# Patient Record
Sex: FEMALE | Race: BLACK OR AFRICAN AMERICAN | NOT HISPANIC OR LATINO | URBAN - METROPOLITAN AREA
[De-identification: names, ages, dates, MRNs, and addresses within clinical notes are randomized per-mention and may not be internally consistent; named-entity substitution may affect disease eponyms.]

---

## 2021-01-24 ENCOUNTER — INPATIENT (INPATIENT)
Facility: HOSPITAL | Age: 81
LOS: 15 days | Discharge: HOME | End: 2021-02-09
Attending: INTERNAL MEDICINE | Admitting: INTERNAL MEDICINE
Payer: MEDICAID

## 2021-01-24 VITALS
HEART RATE: 95 BPM | OXYGEN SATURATION: 90 % | RESPIRATION RATE: 21 BRPM | DIASTOLIC BLOOD PRESSURE: 76 MMHG | TEMPERATURE: 101 F | HEIGHT: 64 IN | WEIGHT: 169.98 LBS | SYSTOLIC BLOOD PRESSURE: 149 MMHG

## 2021-01-24 DIAGNOSIS — U07.1 COVID-19: ICD-10-CM

## 2021-01-24 DIAGNOSIS — Z98.890 OTHER SPECIFIED POSTPROCEDURAL STATES: Chronic | ICD-10-CM

## 2021-01-24 DIAGNOSIS — R09.02 HYPOXEMIA: ICD-10-CM

## 2021-01-24 DIAGNOSIS — R06.03 ACUTE RESPIRATORY DISTRESS: ICD-10-CM

## 2021-01-24 DIAGNOSIS — I10 ESSENTIAL (PRIMARY) HYPERTENSION: ICD-10-CM

## 2021-01-24 LAB
ALBUMIN SERPL ELPH-MCNC: 2.8 G/DL — LOW (ref 3.3–5)
ALP SERPL-CCNC: 77 U/L — SIGNIFICANT CHANGE UP (ref 40–120)
ALT FLD-CCNC: 38 U/L — SIGNIFICANT CHANGE UP (ref 12–78)
ANION GAP SERPL CALC-SCNC: 5 MMOL/L — SIGNIFICANT CHANGE UP (ref 5–17)
APTT BLD: 30 SEC — SIGNIFICANT CHANGE UP (ref 27.5–35.5)
AST SERPL-CCNC: 72 U/L — HIGH (ref 15–37)
BILIRUB SERPL-MCNC: 0.3 MG/DL — SIGNIFICANT CHANGE UP (ref 0.2–1.2)
BUN SERPL-MCNC: 7 MG/DL — SIGNIFICANT CHANGE UP (ref 7–23)
CALCIUM SERPL-MCNC: 7.5 MG/DL — LOW (ref 8.5–10.1)
CHLORIDE SERPL-SCNC: 99 MMOL/L — SIGNIFICANT CHANGE UP (ref 96–108)
CK SERPL-CCNC: 687 U/L — HIGH (ref 26–192)
CO2 SERPL-SCNC: 28 MMOL/L — SIGNIFICANT CHANGE UP (ref 22–31)
CREAT SERPL-MCNC: 1.11 MG/DL — SIGNIFICANT CHANGE UP (ref 0.5–1.3)
D DIMER BLD IA.RAPID-MCNC: 298 NG/ML DDU — HIGH
FIBRINOGEN PPP-MCNC: 637 MG/DL — HIGH (ref 290–520)
FLUAV AG NPH QL: SIGNIFICANT CHANGE UP COUNTS
FLUBV AG NPH QL: SIGNIFICANT CHANGE UP COUNTS
GLUCOSE SERPL-MCNC: 157 MG/DL — HIGH (ref 70–99)
INR BLD: 1.12 RATIO — SIGNIFICANT CHANGE UP (ref 0.88–1.16)
LACTATE SERPL-SCNC: 1.4 MMOL/L — SIGNIFICANT CHANGE UP (ref 0.7–2)
NT-PROBNP SERPL-SCNC: 60 PG/ML — SIGNIFICANT CHANGE UP (ref 0–450)
POTASSIUM SERPL-MCNC: 3.8 MMOL/L — SIGNIFICANT CHANGE UP (ref 3.5–5.3)
POTASSIUM SERPL-SCNC: 3.8 MMOL/L — SIGNIFICANT CHANGE UP (ref 3.5–5.3)
PROT SERPL-MCNC: 7 GM/DL — SIGNIFICANT CHANGE UP (ref 6–8.3)
PROTHROM AB SERPL-ACNC: 12.9 SEC — SIGNIFICANT CHANGE UP (ref 10.6–13.6)
RSV RNA NPH QL NAA+NON-PROBE: SIGNIFICANT CHANGE UP COUNTS
SARS-COV-2 RNA SPEC QL NAA+PROBE: DETECTED COUNTS
SODIUM SERPL-SCNC: 132 MMOL/L — LOW (ref 135–145)
TROPONIN I SERPL-MCNC: <.015 NG/ML — SIGNIFICANT CHANGE UP (ref 0.01–0.04)

## 2021-01-24 PROCEDURE — 99285 EMERGENCY DEPT VISIT HI MDM: CPT

## 2021-01-24 PROCEDURE — 93010 ELECTROCARDIOGRAM REPORT: CPT

## 2021-01-24 PROCEDURE — 71045 X-RAY EXAM CHEST 1 VIEW: CPT | Mod: 26

## 2021-01-24 RX ORDER — SODIUM CHLORIDE 9 MG/ML
1000 INJECTION, SOLUTION INTRAVENOUS
Refills: 0 | Status: DISCONTINUED | OUTPATIENT
Start: 2021-01-24 | End: 2021-01-27

## 2021-01-24 RX ORDER — REMDESIVIR 5 MG/ML
200 INJECTION INTRAVENOUS EVERY 24 HOURS
Refills: 0 | Status: COMPLETED | OUTPATIENT
Start: 2021-01-24 | End: 2021-01-25

## 2021-01-24 RX ORDER — ACETAMINOPHEN 500 MG
650 TABLET ORAL EVERY 4 HOURS
Refills: 0 | Status: DISCONTINUED | OUTPATIENT
Start: 2021-01-24 | End: 2021-02-09

## 2021-01-24 RX ORDER — AZITHROMYCIN 500 MG/1
500 TABLET, FILM COATED ORAL EVERY 24 HOURS
Refills: 0 | Status: COMPLETED | OUTPATIENT
Start: 2021-01-25 | End: 2021-01-25

## 2021-01-24 RX ORDER — AMLODIPINE BESYLATE 2.5 MG/1
5 TABLET ORAL DAILY
Refills: 0 | Status: DISCONTINUED | OUTPATIENT
Start: 2021-01-24 | End: 2021-02-09

## 2021-01-24 RX ORDER — AZITHROMYCIN 500 MG/1
500 TABLET, FILM COATED ORAL ONCE
Refills: 0 | Status: COMPLETED | OUTPATIENT
Start: 2021-01-24 | End: 2021-01-24

## 2021-01-24 RX ORDER — DEXAMETHASONE 0.5 MG/5ML
6 ELIXIR ORAL DAILY
Refills: 0 | Status: DISCONTINUED | OUTPATIENT
Start: 2021-01-24 | End: 2021-01-26

## 2021-01-24 RX ORDER — AZITHROMYCIN 500 MG/1
TABLET, FILM COATED ORAL
Refills: 0 | Status: COMPLETED | OUTPATIENT
Start: 2021-01-24 | End: 2021-01-26

## 2021-01-24 RX ORDER — ENOXAPARIN SODIUM 100 MG/ML
40 INJECTION SUBCUTANEOUS DAILY
Refills: 0 | Status: DISCONTINUED | OUTPATIENT
Start: 2021-01-24 | End: 2021-01-25

## 2021-01-24 RX ORDER — REMDESIVIR 5 MG/ML
INJECTION INTRAVENOUS
Refills: 0 | Status: COMPLETED | OUTPATIENT
Start: 2021-01-24 | End: 2021-01-29

## 2021-01-24 RX ORDER — ALBUTEROL 90 UG/1
2 AEROSOL, METERED ORAL EVERY 4 HOURS
Refills: 0 | Status: DISCONTINUED | OUTPATIENT
Start: 2021-01-24 | End: 2021-02-09

## 2021-01-24 RX ADMIN — AZITHROMYCIN 255 MILLIGRAM(S): 500 TABLET, FILM COATED ORAL at 23:08

## 2021-01-24 RX ADMIN — Medication 6 MILLIGRAM(S): at 22:16

## 2021-01-24 RX ADMIN — SODIUM CHLORIDE 70 MILLILITER(S): 9 INJECTION, SOLUTION INTRAVENOUS at 22:59

## 2021-01-24 NOTE — ED PROVIDER NOTE - CLINICAL SUMMARY MEDICAL DECISION MAKING FREE TEXT BOX
covid-19 hypoxia 80F pmhx of htn, covid-19 (+) x 6 days, presenting with worsening hypoxia (+) 90% on 2LNC, (+) cough, (+) exertional dyspnea over past few days, (+) mild respiratory distress. Placed on NRB and NC, improved to 100%. Pending CTA chest to R/O PE. Admitted to Dr. Farah's service. EKG unremarkable. Do not suspect ACS.

## 2021-01-24 NOTE — H&P ADULT - HISTORY OF PRESENT ILLNESS
80F Akan speaking  PMHX HTN c/o fever for few days and get  tested COVID-19 (+) 6 days ago but the past few days she is having shortness of breath as well  as worsening dyspnea on exertion now presenting with hypoxia 90% on RA. Pt stated she is feeling tired and weak unable to ambulate.

## 2021-01-24 NOTE — H&P ADULT - ASSESSMENT
80F PMHX HTN c/o fever for few days and get  tested COVID-19 (+) 6 days ago but the past few days she is having shortness of breath as well  as worsening dyspnea on exertion now presenting with hypoxia 90% on RA. Pt stated she is feeling tired and weak unable to ambulate.

## 2021-01-24 NOTE — H&P ADULT - REASON FOR ADMISSION
September 22, 2017    Tiffanie Castrejon  [address] [place] [id number]    Dear Tiffanie,  We are happy to inform you that your PAP smear result from 9/7/17 is normal.  We are now able to do a follow up test on PAP smears. The DNA test is for HPV (Human Papilloma Virus). Cervical cancer is closely linked with certain types of HPV. Your result showed no evidence of high risk HPV.  Therefore we recommend you return in 3 years for your next pap smear.  You will still need to return to the clinic every year for an annual exam and other preventive tests.  Please contact the clinic at [phone] with any questions.  Sincerely,    ALTAGRACIA Winslow CNP/rlm  
sob

## 2021-01-24 NOTE — ED PROVIDER NOTE - PHYSICAL EXAMINATION
VITAL SIGNS: I have reviewed nursing notes and confirm.   GEN: Well-developed; well-nourished; in mild acute resp distress. (+) hypoxia to 90% on 2L NC. Speaking few words per sentences.  SKIN: Warm, pink, no rash, no diaphoresis, no cyanosis, well perfused.   HEAD: Normocephalic; atraumatic. No scalp lacerations, no abrasions.  NECK: Supple; non tender.   EYES: Pupils 3mm equal, round, reactive to light and accomodation, conjunctiva and sclera clear. Extra-ocular movements intact bilaterally.  ENT: No nasal discharge; airway clear. Trachea is midline. ORAL: No oropharyngeal exudates or erythema. Normal dentition.  CV: RRR, S1, S2 normal; no murmurs, gallops, or rubs. No lower extremity pitting edema bilaterally. Capillary refill < 2 seconds throughout. Distal pulses intact 2+ throughout.  RESP: (+) tachypneic, CTA bilaterally. (+) LLL rhonchi, no wheezing or rales.  ABD: Normal bowel sounds, soft, non-distended, non-tender, no hepatosplenomegaly. No CVA tenderness bilaterally.  MSK: Normal range of motion and movement of all 4 extremities. No joint or muscular pain throughout. No clubbing.   BACK: No thoracolumbar midline or paravertebral tenderness. No step-offs or obvious deformities.  NEURO: Alert & oriented x 3, Grossly unremarkable. Sensory and motor intact throughout. No focal deficits. Gait: Fluid. Normal speech and coordination.   PSYCH: Cooperative, appropriate.

## 2021-01-24 NOTE — ED ADULT NURSE NOTE - OBJECTIVE STATEMENT
A&Ox4. pt covid +. pt c/o fever/bodyaches/sob/dehydration since last week as per pt's grand-daughter.

## 2021-01-24 NOTE — ED PROVIDER NOTE - NS ED MD DISPO SPECIAL CONSIDERATION1
Fall Precaution/Language Barrier/Close Observation/Remote Telemetry/Continuous Pulse Oximetry/Geriatrics/Frailty/Confirmed COVID-19

## 2021-01-24 NOTE — ED PROVIDER NOTE - OBJECTIVE STATEMENT
80F PMHX HTN presenting with shortness of breath, COVID-19 (+) 6 days ago. Described as worsening dyspnea on exertion now presenting with hypoxia 90% on RA. ROS: Otherwise,  (-) diaphoresis, (+) exertional component, (-) pleuritic component, (-) positional component, (-) abdominal pain, (-) nausea/vomiting, (-) fevers/chills, (-) recent illness, (-) traumatic injury,  (-) shortness of breath, (-) coughing, (-) prior cardiac history, (-) tobacco, (-) IVDU or cocaine use. 80F PMHX HTN presenting with shortness of breath, COVID-19 (+) 6 days ago. Described as worsening dyspnea on exertion, fevers/chills, myalgias now presenting with hypoxia 90% on RA and generalized weakness. In ED, speaking few words per sentence. Denies any abdominal pain, nausea/vomiting,, diarrhea/constipation, dysuria/hematuria, chest pain, syncope, recent travel, recent surgery, immobility, extremity swelling, hormone use, or personal/family history of blood clots.

## 2021-01-25 LAB
ALBUMIN SERPL ELPH-MCNC: 2.7 G/DL — LOW (ref 3.3–5)
ALP SERPL-CCNC: 81 U/L — SIGNIFICANT CHANGE UP (ref 40–120)
ALT FLD-CCNC: 36 U/L — SIGNIFICANT CHANGE UP (ref 12–78)
AST SERPL-CCNC: 70 U/L — HIGH (ref 15–37)
BASE EXCESS BLDV CALC-SCNC: 2.2 MMOL/L — HIGH (ref -2–2)
BASOPHILS # BLD AUTO: 0 K/UL — SIGNIFICANT CHANGE UP (ref 0–0.2)
BASOPHILS NFR BLD AUTO: 0 % — SIGNIFICANT CHANGE UP (ref 0–2)
BILIRUB DIRECT SERPL-MCNC: 0.15 MG/DL — SIGNIFICANT CHANGE UP (ref 0.05–0.2)
BILIRUB INDIRECT FLD-MCNC: 0.2 MG/DL — SIGNIFICANT CHANGE UP (ref 0.2–1)
BILIRUB SERPL-MCNC: 0.3 MG/DL — SIGNIFICANT CHANGE UP (ref 0.2–1.2)
BLOOD GAS COMMENTS, VENOUS: SIGNIFICANT CHANGE UP
CREAT SERPL-MCNC: 0.99 MG/DL — SIGNIFICANT CHANGE UP (ref 0.5–1.3)
CRP SERPL-MCNC: 6.34 MG/DL — HIGH (ref 0–0.4)
EOSINOPHIL # BLD AUTO: 0 K/UL — SIGNIFICANT CHANGE UP (ref 0–0.5)
EOSINOPHIL NFR BLD AUTO: 0 % — SIGNIFICANT CHANGE UP (ref 0–6)
FERRITIN SERPL-MCNC: 469 NG/ML — HIGH (ref 15–150)
GAS PNL BLDV: SIGNIFICANT CHANGE UP
HCO3 BLDV-SCNC: 25 MMOL/L — SIGNIFICANT CHANGE UP (ref 21–29)
HCT VFR BLD CALC: 38.7 % — SIGNIFICANT CHANGE UP (ref 34.5–45)
HGB BLD-MCNC: 13.3 G/DL — SIGNIFICANT CHANGE UP (ref 11.5–15.5)
HOROWITZ INDEX BLDV+IHG-RTO: 0.4 — SIGNIFICANT CHANGE UP
LDH SERPL L TO P-CCNC: 553 U/L — HIGH (ref 50–242)
LYMPHOCYTES # BLD AUTO: 1.59 K/UL — SIGNIFICANT CHANGE UP (ref 1–3.3)
LYMPHOCYTES # BLD AUTO: 38 % — SIGNIFICANT CHANGE UP (ref 13–44)
MANUAL SMEAR VERIFICATION: SIGNIFICANT CHANGE UP
MCHC RBC-ENTMCNC: 30 PG — SIGNIFICANT CHANGE UP (ref 27–34)
MCHC RBC-ENTMCNC: 34.4 GM/DL — SIGNIFICANT CHANGE UP (ref 32–36)
MCV RBC AUTO: 87.4 FL — SIGNIFICANT CHANGE UP (ref 80–100)
MONOCYTES # BLD AUTO: 0.08 K/UL — SIGNIFICANT CHANGE UP (ref 0–0.9)
MONOCYTES NFR BLD AUTO: 2 % — SIGNIFICANT CHANGE UP (ref 2–14)
NEUTROPHILS # BLD AUTO: 2.34 K/UL — SIGNIFICANT CHANGE UP (ref 1.8–7.4)
NEUTROPHILS NFR BLD AUTO: 56 % — SIGNIFICANT CHANGE UP (ref 43–77)
NRBC # BLD: 0 /100 — SIGNIFICANT CHANGE UP (ref 0–0)
NRBC # BLD: SIGNIFICANT CHANGE UP /100 WBCS (ref 0–0)
PCO2 BLDV: 36 MMHG — SIGNIFICANT CHANGE UP (ref 35–50)
PH BLDV: 7.47 — HIGH (ref 7.35–7.45)
PLAT MORPH BLD: NORMAL — SIGNIFICANT CHANGE UP
PLATELET # BLD AUTO: 128 K/UL — LOW (ref 150–400)
PO2 BLDV: 47 MMHG — HIGH (ref 25–45)
PROCALCITONIN SERPL-MCNC: 0.05 NG/ML — SIGNIFICANT CHANGE UP (ref 0.02–0.1)
PROT SERPL-MCNC: 7 GM/DL — SIGNIFICANT CHANGE UP (ref 6–8.3)
RBC # BLD: 4.43 M/UL — SIGNIFICANT CHANGE UP (ref 3.8–5.2)
RBC # FLD: 13.4 % — SIGNIFICANT CHANGE UP (ref 10.3–14.5)
RBC BLD AUTO: NORMAL — SIGNIFICANT CHANGE UP
SAO2 % BLDV: 82 % — SIGNIFICANT CHANGE UP (ref 67–88)
SARS-COV-2 IGG SERPL QL IA: NEGATIVE — SIGNIFICANT CHANGE UP
SARS-COV-2 IGM SERPL IA-ACNC: <0.2 RATIO — SIGNIFICANT CHANGE UP
VARIANT LYMPHS # BLD: 4 % — SIGNIFICANT CHANGE UP (ref 0–6)
WBC # BLD: 4.18 K/UL — SIGNIFICANT CHANGE UP (ref 3.8–10.5)
WBC # FLD AUTO: 4.18 K/UL — SIGNIFICANT CHANGE UP (ref 3.8–10.5)

## 2021-01-25 RX ORDER — ENOXAPARIN SODIUM 100 MG/ML
40 INJECTION SUBCUTANEOUS DAILY
Refills: 0 | Status: DISCONTINUED | OUTPATIENT
Start: 2021-01-26 | End: 2021-01-26

## 2021-01-25 RX ORDER — INFLUENZA VIRUS VACCINE 15; 15; 15; 15 UG/.5ML; UG/.5ML; UG/.5ML; UG/.5ML
0.5 SUSPENSION INTRAMUSCULAR ONCE
Refills: 0 | Status: DISCONTINUED | OUTPATIENT
Start: 2021-01-25 | End: 2021-02-09

## 2021-01-25 RX ORDER — REMDESIVIR 5 MG/ML
100 INJECTION INTRAVENOUS EVERY 24 HOURS
Refills: 0 | Status: COMPLETED | OUTPATIENT
Start: 2021-01-26 | End: 2021-01-28

## 2021-01-25 RX ADMIN — SODIUM CHLORIDE 70 MILLILITER(S): 9 INJECTION, SOLUTION INTRAVENOUS at 17:18

## 2021-01-25 RX ADMIN — ENOXAPARIN SODIUM 40 MILLIGRAM(S): 100 INJECTION SUBCUTANEOUS at 12:23

## 2021-01-25 RX ADMIN — AZITHROMYCIN 255 MILLIGRAM(S): 500 TABLET, FILM COATED ORAL at 22:20

## 2021-01-25 RX ADMIN — Medication 6 MILLIGRAM(S): at 05:51

## 2021-01-25 RX ADMIN — REMDESIVIR 500 MILLIGRAM(S): 5 INJECTION INTRAVENOUS at 00:41

## 2021-01-25 NOTE — PROGRESS NOTE ADULT - SUBJECTIVE AND OBJECTIVE BOX
Statement Selected Patient is a 80y old  Female who presents with a chief complaint of sob (24 Jan 2021 22:09)      INTERVAL HPI/OVERNIGHT EVENTS:  pt is feeling better on NC   MEDICATIONS  (STANDING):  amLODIPine   Tablet 5 milliGRAM(s) Oral daily  azithromycin  IVPB      azithromycin  IVPB 500 milliGRAM(s) IV Intermittent every 24 hours  dexAMETHasone  Injectable 6 milliGRAM(s) IV Push daily  enoxaparin Injectable 40 milliGRAM(s) SubCutaneous daily  influenza   Vaccine 0.5 milliLiter(s) IntraMuscular once  remdesivir  IVPB   IV Intermittent   sodium chloride 0.45%. 1000 milliLiter(s) (70 mL/Hr) IV Continuous <Continuous>    MEDICATIONS  (PRN):  acetaminophen   Tablet .. 650 milliGRAM(s) Oral every 4 hours PRN Temp greater or equal to 38.5C (101.3F)  ALBUTerol    90 MICROgram(s) HFA Inhaler 2 Puff(s) Inhalation every 4 hours PRN Shortness of Breath and/or Wheezing  benzonatate 100 milliGRAM(s) Oral three times a day PRN Cough      Allergies    No Known Allergies    Intolerances        REVIEW OF SYSTEMS:  CONSTITUTIONAL: No fever, weight loss, or fatigue  EYES: No eye pain, visual disturbances, or discharge  ENMT:  No difficulty hearing, tinnitus, vertigo; No sinus or throat pain  NECK: No pain or stiffness  BREASTS: No pain, masses, or nipple discharge  RESPIRATORY: No cough, wheezing, chills or hemoptysis; No shortness of breath  CARDIOVASCULAR: No chest pain, palpitations, dizziness, or leg swelling  GASTROINTESTINAL: No abdominal or epigastric pain. No nausea, vomiting, or hematemesis; No diarrhea or constipation. No melena or hematochezia.  GENITOURINARY: No dysuria, frequency, hematuria, or incontinence  NEUROLOGICAL: No headaches, memory loss, loss of strength, numbness, or tremors  SKIN: No itching, burning, rashes, or lesions   LYMPH NODES: No enlarged glands  ENDOCRINE: No heat or cold intolerance; No hair loss  MUSCULOSKELETAL: No joint pain or swelling; No muscle, back, or extremity pain  PSYCHIATRIC: No depression, anxiety, mood swings, or difficulty sleeping  HEME/LYMPH: No easy bruising, or bleeding gums  ALLERGY AND IMMUNOLOGIC: No hives or eczema    Vital Signs Last 24 Hrs  T(C): 37.1 (25 Jan 2021 04:02), Max: 38.4 (24 Jan 2021 21:24)  T(F): 98.8 (25 Jan 2021 04:02), Max: 101.1 (24 Jan 2021 21:24)  HR: 87 (25 Jan 2021 04:24) (81 - 95)  BP: 116/71 (25 Jan 2021 04:02) (116/71 - 149/76)  BP(mean): --  RR: 19 (25 Jan 2021 04:02) (19 - 21)  SpO2: 94% (25 Jan 2021 04:02) (90% - 94%)    PHYSICAL EXAM:  GENERAL: NAD, well-groomed, well-developed  HEAD:  Atraumatic, Normocephalic  EYES: EOMI, PERRLA, conjunctiva and sclera clear  ENMT: No tonsillar erythema, exudates, or enlargement; Moist mucous membranes, Good dentition, No lesions  NECK: Supple, No JVD, Normal thyroid  NERVOUS SYSTEM:  Alert & Oriented X3, Good concentration; Motor Strength 5/5 B/L upper and lower extremities; DTRs 2+ intact and symmetric  CHEST/LUNG: Clear to percussion bilaterally; No rales, rhonchi, wheezing, or rubs  HEART: Regular rate and rhythm; No murmurs, rubs, or gallops  ABDOMEN: Soft, Nontender, Nondistended; Bowel sounds present  EXTREMITIES:  2+ Peripheral Pulses, No clubbing, cyanosis, or edema  LYMPH: No lymphadenopathy noted  SKIN: No rashes or lesions    LABS:                        13.3   4.18  )-----------( 128      ( 24 Jan 2021 22:27 )             38.7     01-25    x   |  x   |  x   ----------------------------<  x   x    |  x   |  0.99    Ca    7.5<L>      24 Jan 2021 22:27    TPro  7.0  /  Alb  2.7<L>  /  TBili  0.3  /  DBili  .15  /  AST  70<H>  /  ALT  36  /  AlkPhos  81  01-25    PT/INR - ( 24 Jan 2021 22:27 )   PT: 12.9 sec;   INR: 1.12 ratio         PTT - ( 24 Jan 2021 22:27 )  PTT:30.0 sec    CAPILLARY BLOOD GLUCOSE        CULTURES:    HEMOGLOBIN A1C:    CHOLESTEROL:        RADIOLOGY & ADDITIONAL TESTS:

## 2021-01-25 NOTE — PHYSICAL THERAPY INITIAL EVALUATION ADULT - DID THE PATIENT HAVE SURGERY?
This is the hx of A.A. a 81 y/o female patient who was admitted to [facility] [place] due to complications of Hypoxia, Covid 19, respiratory distress affecting medical condition and with subsequent affection on functional mobility./n/a

## 2021-01-25 NOTE — PHYSICAL THERAPY INITIAL EVALUATION ADULT - GENERAL OBSERVATIONS, REHAB EVAL
Pt encountered on supine, AxOx4,  used Barrett [id number], cooperative, + O2 via NC, + Primafit, + IV intact.

## 2021-01-25 NOTE — CONSULT NOTE ADULT - SUBJECTIVE AND OBJECTIVE BOX
Patient is a 80y old  Female who presents with a chief complaint of sob (25 Jan 2021 11:51)    HPI:  80F Akan speaking  HTN and S/P lumbar Laminectomy,  c/o fever and SOB  for few days .    Tested COVID-19 (+) 6 days ago, started having shortness of breath few days ago with worsening dyspnea on exertion . Also reports increasing weakness.  In ED with temperature of 101, SPO2 90% on room air and CXT with bilateral infiltrates.  Non smoker.    PAST MEDICAL & SURGICAL HISTORY:  Hypertension    S/P lumbar laminectomy    FAMILY HISTORY:  No pertinent family history in first degree relatives    SOCIAL HISTORY: BMI (kg/m2): 29 (01-25 @ 04:02). non smoker    Allergies  No Known Allergies    MEDICATIONS  (STANDING):  amLODIPine   Tablet 5 milliGRAM(s) Oral daily  azithromycin  IVPB      azithromycin  IVPB 500 milliGRAM(s) IV Intermittent every 24 hours  dexAMETHasone  Injectable 6 milliGRAM(s) IV Push daily  enoxaparin Injectable 40 milliGRAM(s) SubCutaneous daily  influenza   Vaccine 0.5 milliLiter(s) IntraMuscular once  remdesivir  IVPB   IV Intermittent   sodium chloride 0.45%. 1000 milliLiter(s) (70 mL/Hr) IV Continuous <Continuous>    MEDICATIONS  (PRN):  acetaminophen   Tablet .. 650 milliGRAM(s) Oral every 4 hours PRN Temp greater or equal to 38.5C (101.3F)  ALBUTerol    90 MICROgram(s) HFA Inhaler 2 Puff(s) Inhalation every 4 hours PRN Shortness of Breath and/or Wheezing  benzonatate 100 milliGRAM(s) Oral three times a day PRN Cough    Vital Signs Last 24 Hrs  T(C): 36.7 (25 Jan 2021 16:12), Max: 38.4 (24 Jan 2021 21:24)  T(F): 98.1 (25 Jan 2021 16:12), Max: 101.1 (24 Jan 2021 21:24)  HR: 84 (25 Jan 2021 16:12) (80 - 95)  BP: 113/66 (25 Jan 2021 16:12) (113/66 - 149/76)  BP(mean): --  RR: 17 (25 Jan 2021 16:12) (16 - 21)  SpO2: 94% (25 Jan 2021 16:12) (90% - 96%)    PHYSICAL EXAM:  GEN:         Awake, responsive and comfortable.  HEENT:    Normal.    RESP:        no distress  CVS:          Regular rate and rhythm.   ABD:         Soft, non-tender, non-distended;   SKIN:           Warm and dry.  EXTR:            No clubbing, cyanosis or edema  CNS:              Intact sensory and motor function.  PSYCH:        cooperative, no anxiety or depression    LABS:                        13.3   4.18  )-----------( 128      ( 24 Jan 2021 22:27 )             38.7     01-25    x   |  x   |  x   ----------------------------<  x   x    |  x   |  0.99    Ca    7.5<L>      24 Jan 2021 22:27    TPro  7.0  /  Alb  2.7<L>  /  TBili  0.3  /  DBili  .15  /  AST  70<H>  /  ALT  36  /  AlkPhos  81  01-25    PT/INR - ( 24 Jan 2021 22:27 )   PT: 12.9 sec;   INR: 1.12 ratio      PTT - ( 24 Jan 2021 22:27 )  PTT:30.0 sec    EKG: sinus    RADIOLOGY & ADDITIONAL STUDIES:  < from: Xray Chest 1 View- PORTABLE-Urgent (01.24.21 @ 22:12) >  EXAM:  XR CHEST PORTABLE URGENT 1V                          PROCEDURE DATE:  01/24/2021      INTERPRETATION:  AP chest on January 24, 2021 at 9:57 PM. Patient is short of breath with cough and fever.    COMPARISON: None available.    Heart isenlarged.    Mid lateral lung field infiltrates are consistent with Covid pneumonia.    IMPRESSION: Heart enlargement and Covid pneumonia.    ADONAY MASON MD; Attending Radiologist  This document has been electronically signed. Jan 25 2021  5:25PM    ASSESSMENT AND PLAN:  ·	SOB.  ·	Hypoxia.  ·	COVID pneumonia.  ·	HTN    SPO2 from high 80s to low 90s on nasal O2.  Will try 50% Venti mask.  Continue Remdesivir and Dexamethasone.  On empiric antibiotics.  Will increase prophylactic Lovenox to BID.  Follow D Dimer.

## 2021-01-25 NOTE — PHYSICAL THERAPY INITIAL EVALUATION ADULT - STRENGTHENING, PT EVAL
Improve strength in B UE/LE 4+/5 and be able to perform functional tasks-bed mobility, sitting, standing, transfers and ambulate in a safe manner with or without  assistive device and prevent falls.

## 2021-01-25 NOTE — PHYSICAL THERAPY INITIAL EVALUATION ADULT - ADDITIONAL COMMENTS
As per granddaughter, she lives in a  with 8 stairs with R HR inside the house has 13 stairs with R HR. she used to have an aide but lately she has been independent with rollator/cane. she has a tub shower with shower chair. DME: pt has cane, rollator, shower chair, grab bars.

## 2021-01-25 NOTE — PHYSICAL THERAPY INITIAL EVALUATION ADULT - PERTINENT HX OF CURRENT PROBLEM, REHAB EVAL
This is the hx of A.A. a 79 y/o female patient who was admitted to [facility] due to complications of Hypoxia, Covid 19, respiratory distress affecting medical condition and with subsequent affection on functional mobility.

## 2021-01-25 NOTE — PHYSICAL THERAPY INITIAL EVALUATION ADULT - CRITERIA FOR SKILLED THERAPEUTIC INTERVENTIONS
[facility]/impairments found/functional limitations in following categories/risk reduction/prevention/rehab potential/therapy frequency/predicted duration of therapy intervention/anticipated discharge recommendation

## 2021-01-25 NOTE — PHYSICAL THERAPY INITIAL EVALUATION ADULT - IMPAIRMENTS FOUND, PT EVAL
aerobic capacity/endurance/gait, locomotion, and balance/muscle strength/posture/ventilation and respiration/gas exchange

## 2021-01-25 NOTE — CONSULT NOTE ADULT - ASSESSMENT
covid pneumonia   serologies are low  procal is low   remdesevir dexa   oxygen   prophyllactic heparin   will follow with you thanks

## 2021-01-25 NOTE — CONSULT NOTE ADULT - SUBJECTIVE AND OBJECTIVE BOX
HPI:  80F Akan speaking  PMHX HTN c/o fever for few days and get  tested COVID-19 (+) 6 days ago but the past few days she is having shortness of breath as well  as worsening dyspnea on exertion now presenting with hypoxia 90% on RA. Pt stated she is feeling tired and weak unable to ambulate. (24 Jan 2021 22:09)      PAST MEDICAL & SURGICAL HISTORY:  Hypertension    S/P lumbar laminectomy        SOCHX:   tobacco,  -  alcohol    FMHX: FA/MO  - contributory       Recent Travel:    Immunizations:    Allergies    No Known Allergies    Intolerances        MEDICATIONS  (STANDING):  amLODIPine   Tablet 5 milliGRAM(s) Oral daily  azithromycin  IVPB      azithromycin  IVPB 500 milliGRAM(s) IV Intermittent every 24 hours  dexAMETHasone  Injectable 6 milliGRAM(s) IV Push daily  enoxaparin Injectable 40 milliGRAM(s) SubCutaneous daily  influenza   Vaccine 0.5 milliLiter(s) IntraMuscular once  remdesivir  IVPB   IV Intermittent   sodium chloride 0.45%. 1000 milliLiter(s) (70 mL/Hr) IV Continuous <Continuous>    MEDICATIONS  (PRN):  acetaminophen   Tablet .. 650 milliGRAM(s) Oral every 4 hours PRN Temp greater or equal to 38.5C (101.3F)  ALBUTerol    90 MICROgram(s) HFA Inhaler 2 Puff(s) Inhalation every 4 hours PRN Shortness of Breath and/or Wheezing  benzonatate 100 milliGRAM(s) Oral three times a day PRN Cough      REVIEW OF SYSTEMS:  CONSTITUTIONAL: No fever, weight loss, or fatigue  EYES: No eye pain, visual disturbances, or discharge  ENMT:  No difficulty hearing, tinnitus, vertigo; No sinus or throat pain  NECK: No pain or stiffness  BREASTS: No pain, masses, or nipple discharge  RESPIRATORY: No cough, wheezing, chills or hemoptysis; No shortness of breath  CARDIOVASCULAR: No chest pain, palpitations, dizziness, or leg swelling  GASTROINTESTINAL: No abdominal or epigastric pain. No nausea, vomiting, or hematemesis; No diarrhea or constipation. No melena or hematochezia.  GENITOURINARY: No dysuria, frequency, hematuria, or incontinence  NEUROLOGICAL: No headaches, memory loss, loss of strength, numbness, or tremors  SKIN: No itching, burning, rashes, or lesions   LYMPH NODES: No enlarged glands  ENDOCRINE: No heat or cold intolerance; No hair loss  MUSCULOSKELETAL: No joint pain or swelling; No muscle, back, or extremity pain  PSYCHIATRIC: No depression, anxiety, mood swings, or difficulty sleeping  HEME/LYMPH: No easy bruising, or bleeding gums  ALLERGY AND IMMUNOLOGIC: No hives or eczema    VITAL SIGNS:    T(C): 36.7 (01-25-21 @ 16:12), Max: 38.4 (01-24-21 @ 21:24)  T(F): 98.1 (01-25-21 @ 16:12), Max: 101.1 (01-24-21 @ 21:24)  HR: 92 (01-25-21 @ 18:38) (80 - 95)  BP: 117/75 (01-25-21 @ 18:38) (113/66 - 149/76)  RR: 18 (01-25-21 @ 18:38) (16 - 21)  SpO2: 92% (01-25-21 @ 18:38) (90% - 96%)    PHYSICAL EXAM:    GENERAL: NAD, well-groomed, well-developed  HEAD:  Atraumatic, Normocephalic  EYES: EOMI, PERRLA, conjunctiva and sclera clear  ENMT: No tonsillar erythema, exudates, or enlargement; Moist mucous membranes, Good dentition, No lesions  NECK: Supple, No JVD, Normal thyroid  NERVOUS SYSTEM:  Alert & Oriented X3, Good concentration; Motor Strength 5/5 B/L upper and lower extremities; DTRs 2+ intact and symmetric  CHEST/LUNG: Clear to auscultation bilaterally; No rales, rhonchi, wheezing bilaterally  HEART: Regular rate and rhythm; No murmurs, rubs, or gallops  ABDOMEN: Soft, Nontender, Nondistended; Bowel sounds present  EXTREMITIES:  2+ Peripheral Pulses, No clubbing, cyanosis, or edema  LYMPH: No lymphadenopathy noted  SKIN: No rashes or lesions  BACK: no pressor sore     LABS:                         13.3   4.18  )-----------( 128      ( 24 Jan 2021 22:27 )             38.7     01-25    x   |  x   |  x   ----------------------------<  x   x    |  x   |  0.99    Ca    7.5<L>      24 Jan 2021 22:27    TPro  7.0  /  Alb  2.7<L>  /  TBili  0.3  /  DBili  .15  /  AST  70<H>  /  ALT  36  /  AlkPhos  81  01-25    LIVER FUNCTIONS - ( 25 Jan 2021 05:43 )  Alb: 2.7 g/dL / Pro: 7.0 gm/dL / ALK PHOS: 81 U/L / ALT: 36 U/L / AST: 70 U/L / GGT: x           PT/INR - ( 24 Jan 2021 22:27 )   PT: 12.9 sec;   INR: 1.12 ratio         PTT - ( 24 Jan 2021 22:27 )  PTT:30.0 sec      CARDIAC MARKERS ( 24 Jan 2021 22:27 )  <.015 ng/mL / x     / 687 U/L / x     / x                                      Radiology:    t< from: Xray Chest 1 View- PORTABLE-Urgent (01.24.21 @ 22:12) >  IMPRESSION: Heart enlargement and Covid pneumonia.            ADONAY MASON MD; Attending Radiologist  This document has been electronically signed. Jan 25 2021  5:25PM    < end of copied text >     HPI:  80F Akan speaking  PMHX HTN c/o fever for few days and get  tested COVID-19 (+) 6 days ago but the past few days she is having shortness of breath as well  as worsening dyspnea on exertion now presenting with hypoxia 90% on RA. Pt stated she is feeling tired and weak unable to ambulate. (24 Jan 2021 22:09)  limited info from patient  lives in [facility]    PAST MEDICAL & SURGICAL HISTORY:  Hypertension    S/P lumbar laminectomy        SOCHX:  no  tobacco, no  -  alcohol    FMHX: FA/MO  - noncontributory       Recent Travel:    Immunizations:    Allergies    No Known Allergies    Intolerances        MEDICATIONS  (STANDING):  amLODIPine   Tablet 5 milliGRAM(s) Oral daily  azithromycin  IVPB      azithromycin  IVPB 500 milliGRAM(s) IV Intermittent every 24 hours  dexAMETHasone  Injectable 6 milliGRAM(s) IV Push daily  enoxaparin Injectable 40 milliGRAM(s) SubCutaneous daily  influenza   Vaccine 0.5 milliLiter(s) IntraMuscular once  remdesivir  IVPB   IV Intermittent   sodium chloride 0.45%. 1000 milliLiter(s) (70 mL/Hr) IV Continuous <Continuous>    MEDICATIONS  (PRN):  acetaminophen   Tablet .. 650 milliGRAM(s) Oral every 4 hours PRN Temp greater or equal to 38.5C (101.3F)  ALBUTerol    90 MICROgram(s) HFA Inhaler 2 Puff(s) Inhalation every 4 hours PRN Shortness of Breath and/or Wheezing  benzonatate 100 milliGRAM(s) Oral three times a day PRN Cough      REVIEW OF SYSTEMS:  CONSTITUTIONAL:  pos fever, weight loss,  fatigue  EYES: No eye pain, visual disturbances, or discharge  ENMT:  No difficulty hearing, tinnitus, vertigo; No sinus or throat pain  NECK: No pain or stiffness  BREASTS: No pain, masses, or nipple discharge  RESPIRATORY:positive  cough, wheezing,   no chills or hemoptysis;   has  shortness of breath  CARDIOVASCULAR: No chest pain, palpitations, dizziness, or leg swelling  GASTROINTESTINAL: No abdominal or epigastric pain. No nausea, vomiting, or hematemesis; No diarrhea or constipation. No melena or hematochezia.  GENITOURINARY: No dysuria, frequency, hematuria, or incontinence  NEUROLOGICAL: No headaches, memory loss, loss of strength, numbness, or tremors  SKIN: No itching, burning, rashes, or lesions   LYMPH NODES: No enlarged glands  ENDOCRINE: No heat or cold intolerance; No hair loss  MUSCULOSKELETAL: No joint pain or swelling; No muscle, back, or extremity pain  PSYCHIATRIC: No depression, anxiety, mood swings, or difficulty sleeping  HEME/LYMPH: No easy bruising, or bleeding gums  ALLERGY AND IMMUNOLOGIC: No hives or eczema  info from chart  VITAL SIGNS:    T(C): 36.7 (01-25-21 @ 16:12), Max: 38.4 (01-24-21 @ 21:24)  T(F): 98.1 (01-25-21 @ 16:12), Max: 101.1 (01-24-21 @ 21:24)  HR: 92 (01-25-21 @ 18:38) (80 - 95)  BP: 117/75 (01-25-21 @ 18:38) (113/66 - 149/76)  RR: 18 (01-25-21 @ 18:38) (16 - 21)  SpO2: 92% (01-25-21 @ 18:38) (90% - 96%)    PHYSICAL EXAM:    GENERAL: NAD, well-groomed, well-developed  on ventimask  HEAD:  Atraumatic, Normocephalic  EYES: EOMI, PERRLA, conjunctiva and sclera clear  ENMT:  Moist mucous membranes,NECK: Supple, No JVD, Normal thyroid  NERVOUS SYSTEM:  Alert & Oriented X3, Good concentration;   limited exam  CHEST/LUNG: Clear to auscultation bilaterally; No rales, rhonchi, wheezing bilaterally  HEART: Regular rate and rhythm; No murmurs, rubs, or gallops  ABDOMEN: Soft, Nontender, Nondistended; Bowel sounds present  EXTREMITIES:  2+ Peripheral Pulses, No clubbing, cyanosis, or edema  LYMPH: No lymphadenopathy noted  SKIN: No rashes or lesions  BACK: no pressor sore     LABS:                         13.3   4.18  )-----------( 128      ( 24 Jan 2021 22:27 )             38.7     01-25    x   |  x   |  x   ----------------------------<  x   x    |  x   |  0.99    Ca    7.5<L>      24 Jan 2021 22:27    TPro  7.0  /  Alb  2.7<L>  /  TBili  0.3  /  DBili  .15  /  AST  70<H>  /  ALT  36  /  AlkPhos  81  01-25    LIVER FUNCTIONS - ( 25 Jan 2021 05:43 )  Alb: 2.7 g/dL / Pro: 7.0 gm/dL / ALK PHOS: 81 U/L / ALT: 36 U/L / AST: 70 U/L / GGT: x           PT/INR - ( 24 Jan 2021 22:27 )   PT: 12.9 sec;   INR: 1.12 ratio         PTT - ( 24 Jan 2021 22:27 )  PTT:30.0 sec      CARDIAC MARKERS ( 24 Jan 2021 22:27 )  <.015 ng/mL / x     / 687 U/L / x     / x                                      Radiology:    t< from: Xray Chest 1 View- PORTABLE-Urgent (01.24.21 @ 22:12) >  IMPRESSION: Heart enlargement and Covid pneumonia.            ADONAY MASON MD; Attending Radiologist  This document has been electronically signed. Jan 25 2021  5:25PM    < end of copied text >

## 2021-01-26 LAB
ALBUMIN SERPL ELPH-MCNC: 2.7 G/DL — LOW (ref 3.3–5)
ALP SERPL-CCNC: 88 U/L — SIGNIFICANT CHANGE UP (ref 40–120)
ALT FLD-CCNC: 43 U/L — SIGNIFICANT CHANGE UP (ref 12–78)
ANION GAP SERPL CALC-SCNC: 7 MMOL/L — SIGNIFICANT CHANGE UP (ref 5–17)
AST SERPL-CCNC: 74 U/L — HIGH (ref 15–37)
BILIRUB DIRECT SERPL-MCNC: 0.13 MG/DL — SIGNIFICANT CHANGE UP (ref 0.05–0.2)
BILIRUB INDIRECT FLD-MCNC: 0.2 MG/DL — SIGNIFICANT CHANGE UP (ref 0.2–1)
BILIRUB SERPL-MCNC: 0.3 MG/DL — SIGNIFICANT CHANGE UP (ref 0.2–1.2)
BUN SERPL-MCNC: 10 MG/DL — SIGNIFICANT CHANGE UP (ref 7–23)
CALCIUM SERPL-MCNC: 7.7 MG/DL — LOW (ref 8.5–10.1)
CHLORIDE SERPL-SCNC: 103 MMOL/L — SIGNIFICANT CHANGE UP (ref 96–108)
CO2 SERPL-SCNC: 28 MMOL/L — SIGNIFICANT CHANGE UP (ref 22–31)
CREAT SERPL-MCNC: 0.7 MG/DL — SIGNIFICANT CHANGE UP (ref 0.5–1.3)
CREAT SERPL-MCNC: 0.73 MG/DL — SIGNIFICANT CHANGE UP (ref 0.5–1.3)
D DIMER BLD IA.RAPID-MCNC: 406 NG/ML DDU — HIGH
GLUCOSE SERPL-MCNC: 93 MG/DL — SIGNIFICANT CHANGE UP (ref 70–99)
HCT VFR BLD CALC: 43.9 % — SIGNIFICANT CHANGE UP (ref 34.5–45)
HGB BLD-MCNC: 14.2 G/DL — SIGNIFICANT CHANGE UP (ref 11.5–15.5)
MCHC RBC-ENTMCNC: 29.4 PG — SIGNIFICANT CHANGE UP (ref 27–34)
MCHC RBC-ENTMCNC: 32.3 GM/DL — SIGNIFICANT CHANGE UP (ref 32–36)
MCV RBC AUTO: 90.9 FL — SIGNIFICANT CHANGE UP (ref 80–100)
NRBC # BLD: 0 /100 WBCS — SIGNIFICANT CHANGE UP (ref 0–0)
PLATELET # BLD AUTO: 197 K/UL — SIGNIFICANT CHANGE UP (ref 150–400)
POTASSIUM SERPL-MCNC: 3.8 MMOL/L — SIGNIFICANT CHANGE UP (ref 3.5–5.3)
POTASSIUM SERPL-SCNC: 3.8 MMOL/L — SIGNIFICANT CHANGE UP (ref 3.5–5.3)
PROT SERPL-MCNC: 7 GM/DL — SIGNIFICANT CHANGE UP (ref 6–8.3)
RBC # BLD: 4.83 M/UL — SIGNIFICANT CHANGE UP (ref 3.8–5.2)
RBC # FLD: 13.5 % — SIGNIFICANT CHANGE UP (ref 10.3–14.5)
SODIUM SERPL-SCNC: 138 MMOL/L — SIGNIFICANT CHANGE UP (ref 135–145)
WBC # BLD: 6.24 K/UL — SIGNIFICANT CHANGE UP (ref 3.8–10.5)
WBC # FLD AUTO: 6.24 K/UL — SIGNIFICANT CHANGE UP (ref 3.8–10.5)

## 2021-01-26 PROCEDURE — 71275 CT ANGIOGRAPHY CHEST: CPT | Mod: 26

## 2021-01-26 RX ORDER — DEXAMETHASONE 0.5 MG/5ML
6 ELIXIR ORAL THREE TIMES A DAY
Refills: 0 | Status: DISCONTINUED | OUTPATIENT
Start: 2021-01-26 | End: 2021-02-02

## 2021-01-26 RX ORDER — ENOXAPARIN SODIUM 100 MG/ML
40 INJECTION SUBCUTANEOUS
Refills: 0 | Status: DISCONTINUED | OUTPATIENT
Start: 2021-01-26 | End: 2021-01-27

## 2021-01-26 RX ADMIN — ENOXAPARIN SODIUM 40 MILLIGRAM(S): 100 INJECTION SUBCUTANEOUS at 11:48

## 2021-01-26 RX ADMIN — REMDESIVIR 500 MILLIGRAM(S): 5 INJECTION INTRAVENOUS at 23:00

## 2021-01-26 RX ADMIN — SODIUM CHLORIDE 70 MILLILITER(S): 9 INJECTION, SOLUTION INTRAVENOUS at 17:59

## 2021-01-26 RX ADMIN — SODIUM CHLORIDE 40 MILLILITER(S): 9 INJECTION, SOLUTION INTRAVENOUS at 19:43

## 2021-01-26 RX ADMIN — REMDESIVIR 500 MILLIGRAM(S): 5 INJECTION INTRAVENOUS at 01:07

## 2021-01-26 RX ADMIN — AMLODIPINE BESYLATE 5 MILLIGRAM(S): 2.5 TABLET ORAL at 06:11

## 2021-01-26 RX ADMIN — Medication 6 MILLIGRAM(S): at 23:00

## 2021-01-26 RX ADMIN — SODIUM CHLORIDE 70 MILLILITER(S): 9 INJECTION, SOLUTION INTRAVENOUS at 06:14

## 2021-01-26 RX ADMIN — Medication 6 MILLIGRAM(S): at 06:11

## 2021-01-26 NOTE — PROGRESS NOTE ADULT - SUBJECTIVE AND OBJECTIVE BOX
INTERVAL HPI:  80F Akan speaking  HTN and S/P lumbar Laminectomy,  c/o fever and SOB  for few days .    Tested COVID-19 (+) 6 days ago, started having shortness of breath few days ago with worsening dyspnea on exertion . Also reports increasing weakness.  In ED with temperature of 101, SPO2 90% on room air and CXT with bilateral infiltrates.  Non smoker.    OVERNIGHT EVENTS:  Comfortable on nasal O2.    Vital Signs Last 24 Hrs  T(C): 36.9 (26 Jan 2021 16:06), Max: 36.9 (26 Jan 2021 16:06)  T(F): 98.4 (26 Jan 2021 16:06), Max: 98.4 (26 Jan 2021 16:06)  HR: 88 (26 Jan 2021 16:06) (64 - 92)  BP: 126/79 (26 Jan 2021 16:06) (117/75 - 133/58)  BP(mean): --  RR: 18 (26 Jan 2021 16:06) (18 - 18)  SpO2: 90% (26 Jan 2021 16:06) (90% - 96%)    PHYSICAL EXAM:  GEN:         Awake, responsive and comfortable.  HEENT:    Normal.    RESP:         no distress  CVS:          Regular rate and rhythm.   ABD:         Soft, non-tender, non-distended;     MEDICATIONS  (STANDING):  amLODIPine   Tablet 5 milliGRAM(s) Oral daily  dexAMETHasone  Injectable 6 milliGRAM(s) IV Push daily  enoxaparin Injectable 40 milliGRAM(s) SubCutaneous daily  influenza   Vaccine 0.5 milliLiter(s) IntraMuscular once  remdesivir  IVPB 100 milliGRAM(s) IV Intermittent every 24 hours  remdesivir  IVPB   IV Intermittent   sodium chloride 0.45%. 1000 milliLiter(s) (70 mL/Hr) IV Continuous <Continuous>    MEDICATIONS  (PRN):  acetaminophen   Tablet .. 650 milliGRAM(s) Oral every 4 hours PRN Temp greater or equal to 38.5C (101.3F)  ALBUTerol    90 MICROgram(s) HFA Inhaler 2 Puff(s) Inhalation every 4 hours PRN Shortness of Breath and/or Wheezing  benzonatate 100 milliGRAM(s) Oral three times a day PRN Cough    LABS:                        14.2   6.24  )-----------( 197      ( 26 Jan 2021 10:09 )             43.9     01-26    138  |  103  |  10  ----------------------------<  93  3.8   |  28  |  0.70    Ca    7.7<L>      26 Jan 2021 10:09    TPro  7.0  /  Alb  2.7<L>  /  TBili  0.3  /  DBili  .13  /  AST  74<H>  /  ALT  43  /  AlkPhos  88  01-26    PT/INR - ( 24 Jan 2021 22:27 )   PT: 12.9 sec;   INR: 1.12 ratio      PTT - ( 24 Jan 2021 22:27 )  PTT:30.0 sec  < from: CT Angio Chest w/ IV Cont (01.26.21 @ 10:50) >  EXAM:  CT ANGIO CHEST (W)AW IC                          PROCEDURE DATE:  01/26/2021      INTERPRETATION:  CLINICAL INFORMATION: Covid 19, short of breath    COMPARISON: None.    PROCEDURE:  CT Angiography of the Chest.  90 ml of Omnipaque 350was injected intravenously. 10 ml were discarded.  Sagittal and coronal reformats were performed as well as 3D (MIP) reconstructions.    FINDINGS:  Patient is obese  LUNGS AND AIRWAYS: Patent central airways.  Extensive bilateral multifocal groundglass infiltrates consistent with Covid 19 pneumonia  PLEURA: Trace bilateral effusions.  MEDIASTINUM AND DEV: No lymphadenopathy.  VESSELS: No pulmonary emboli. Normal caliber thoracic aorta.  HEART: Cardiomegaly. No pericardial effusion.  CHEST WALL AND LOWER NECK: Within normal limits.  VISUALIZED UPPER ABDOMEN: Within normal limits.  BONES: No acute or aggressive pathology.    IMPRESSION:  Negative for pulmonary emboli.  Multifocal bilateral pneumonia presumed Covid 19 infection    RAFIQ CORTEZ MD; Attending Radiologist  This document has been electronically signed. Jan 26 2021 11:15AM    ASSESSMENT AND PLAN:  ·	SOB.  ·	Hypoxia.  ·	COVID pneumonia.  ·	HTN    Will reduce IV fluids.  Continue Remdesivir, Dexamethasone and SQ Lovenox.  D Dimer > 400, will follow.   INTERVAL HPI:  80F Akan speaking  HTN and S/P lumbar Laminectomy,  c/o fever and SOB  for few days .    Tested COVID-19 (+) 6 days ago, started having shortness of breath few days ago with worsening dyspnea on exertion . Also reports increasing weakness.  In ED with temperature of 101, SPO2 90% on room air and CXT with bilateral infiltrates.  Non smoker.    OVERNIGHT EVENTS:  Comfortable on nasal O2.    Vital Signs Last 24 Hrs  T(C): 36.9 (26 Jan 2021 16:06), Max: 36.9 (26 Jan 2021 16:06)  T(F): 98.4 (26 Jan 2021 16:06), Max: 98.4 (26 Jan 2021 16:06)  HR: 88 (26 Jan 2021 16:06) (64 - 92)  BP: 126/79 (26 Jan 2021 16:06) (117/75 - 133/58)  BP(mean): --  RR: 18 (26 Jan 2021 16:06) (18 - 18)  SpO2: 90% (26 Jan 2021 16:06) (90% - 96%)    PHYSICAL EXAM:  GEN:         Awake, responsive and comfortable.  HEENT:    Normal.    RESP:         no distress  CVS:          Regular rate and rhythm.   ABD:         Soft, non-tender, non-distended;     MEDICATIONS  (STANDING):  amLODIPine   Tablet 5 milliGRAM(s) Oral daily  dexAMETHasone  Injectable 6 milliGRAM(s) IV Push daily  enoxaparin Injectable 40 milliGRAM(s) SubCutaneous daily  influenza   Vaccine 0.5 milliLiter(s) IntraMuscular once  remdesivir  IVPB 100 milliGRAM(s) IV Intermittent every 24 hours  remdesivir  IVPB   IV Intermittent   sodium chloride 0.45%. 1000 milliLiter(s) (70 mL/Hr) IV Continuous <Continuous>    MEDICATIONS  (PRN):  acetaminophen   Tablet .. 650 milliGRAM(s) Oral every 4 hours PRN Temp greater or equal to 38.5C (101.3F)  ALBUTerol    90 MICROgram(s) HFA Inhaler 2 Puff(s) Inhalation every 4 hours PRN Shortness of Breath and/or Wheezing  benzonatate 100 milliGRAM(s) Oral three times a day PRN Cough    LABS:                        14.2   6.24  )-----------( 197      ( 26 Jan 2021 10:09 )             43.9     01-26    138  |  103  |  10  ----------------------------<  93  3.8   |  28  |  0.70    Ca    7.7<L>      26 Jan 2021 10:09    TPro  7.0  /  Alb  2.7<L>  /  TBili  0.3  /  DBili  .13  /  AST  74<H>  /  ALT  43  /  AlkPhos  88  01-26    PT/INR - ( 24 Jan 2021 22:27 )   PT: 12.9 sec;   INR: 1.12 ratio      PTT - ( 24 Jan 2021 22:27 )  PTT:30.0 sec  < from: CT Angio Chest w/ IV Cont (01.26.21 @ 10:50) >  EXAM:  CT ANGIO CHEST (W)AW IC                          PROCEDURE DATE:  01/26/2021      INTERPRETATION:  CLINICAL INFORMATION: Covid 19, short of breath    COMPARISON: None.    PROCEDURE:  CT Angiography of the Chest.  90 ml of Omnipaque 350was injected intravenously. 10 ml were discarded.  Sagittal and coronal reformats were performed as well as 3D (MIP) reconstructions.    FINDINGS:  Patient is obese  LUNGS AND AIRWAYS: Patent central airways.  Extensive bilateral multifocal groundglass infiltrates consistent with Covid 19 pneumonia  PLEURA: Trace bilateral effusions.  MEDIASTINUM AND DEV: No lymphadenopathy.  VESSELS: No pulmonary emboli. Normal caliber thoracic aorta.  HEART: Cardiomegaly. No pericardial effusion.  CHEST WALL AND LOWER NECK: Within normal limits.  VISUALIZED UPPER ABDOMEN: Within normal limits.  BONES: No acute or aggressive pathology.    IMPRESSION:  Negative for pulmonary emboli.  Multifocal bilateral pneumonia presumed Covid 19 infection    RAFIQ CORTEZ MD; Attending Radiologist  This document has been electronically signed. Jan 26 2021 11:15AM    ASSESSMENT AND PLAN:  ·	SOB.  ·	Hypoxia.  ·	COVID pneumonia.  ·	HTN    Will reduce IV fluids.  Continue Remdesivir, Dexamethasone and SQ Lovenox.  D Dimer > 400, will follow.  ADDENDUM:  Now requiring NRB mask. D Dimer is also up trending.  Will increase steroids and Lovenox.  If D DImer still going up, will start on full anticoagulation.

## 2021-01-26 NOTE — PROGRESS NOTE ADULT - SUBJECTIVE AND OBJECTIVE BOX
Patient is a 80y old  Female who presents with a chief complaint of sob (25 Jan 2021 19:51)      INTERVAL HPI/OVERNIGHT EVENTS:  pt still having sob  MEDICATIONS  (STANDING):  amLODIPine   Tablet 5 milliGRAM(s) Oral daily  dexAMETHasone  Injectable 6 milliGRAM(s) IV Push daily  enoxaparin Injectable 40 milliGRAM(s) SubCutaneous daily  influenza   Vaccine 0.5 milliLiter(s) IntraMuscular once  remdesivir  IVPB 100 milliGRAM(s) IV Intermittent every 24 hours  remdesivir  IVPB   IV Intermittent   sodium chloride 0.45%. 1000 milliLiter(s) (70 mL/Hr) IV Continuous <Continuous>    MEDICATIONS  (PRN):  acetaminophen   Tablet .. 650 milliGRAM(s) Oral every 4 hours PRN Temp greater or equal to 38.5C (101.3F)  ALBUTerol    90 MICROgram(s) HFA Inhaler 2 Puff(s) Inhalation every 4 hours PRN Shortness of Breath and/or Wheezing  benzonatate 100 milliGRAM(s) Oral three times a day PRN Cough      Allergies    No Known Allergies    Intolerances        REVIEW OF SYSTEMS:  CONSTITUTIONAL: No fever, weight loss, or fatigue  EYES: No eye pain, visual disturbances, or discharge  ENMT:  No difficulty hearing, tinnitus, vertigo; No sinus or throat pain  NECK: No pain or stiffness  BREASTS: No pain, masses, or nipple discharge  RESPIRATORY: No cough, wheezing, chills or hemoptysis; No shortness of breath  CARDIOVASCULAR: No chest pain, palpitations, dizziness, or leg swelling  GASTROINTESTINAL: No abdominal or epigastric pain. No nausea, vomiting, or hematemesis; No diarrhea or constipation. No melena or hematochezia.  GENITOURINARY: No dysuria, frequency, hematuria, or incontinence  NEUROLOGICAL: No headaches, memory loss, loss of strength, numbness, or tremors  SKIN: No itching, burning, rashes, or lesions   LYMPH NODES: No enlarged glands  ENDOCRINE: No heat or cold intolerance; No hair loss  MUSCULOSKELETAL: No joint pain or swelling; No muscle, back, or extremity pain  PSYCHIATRIC: No depression, anxiety, mood swings, or difficulty sleeping  HEME/LYMPH: No easy bruising, or bleeding gums  ALLERGY AND IMMUNOLOGIC: No hives or eczema    Vital Signs Last 24 Hrs  T(C): 36.8 (26 Jan 2021 11:19), Max: 36.8 (26 Jan 2021 06:12)  T(F): 98.2 (26 Jan 2021 11:19), Max: 98.3 (26 Jan 2021 06:12)  HR: 88 (26 Jan 2021 11:19) (76 - 92)  BP: 122/69 (26 Jan 2021 11:19) (113/66 - 133/58)  BP(mean): --  RR: 18 (26 Jan 2021 11:19) (17 - 18)  SpO2: 93% (26 Jan 2021 11:19) (92% - 96%)    PHYSICAL EXAM:  GENERAL: NAD, well-groomed, well-developed  HEAD:  Atraumatic, Normocephalic  EYES: EOMI, PERRLA, conjunctiva and sclera clear  ENMT: No tonsillar erythema, exudates, or enlargement; Moist mucous membranes, Good dentition, No lesions  NECK: Supple, No JVD, Normal thyroid  NERVOUS SYSTEM:  Alert & Oriented X3, Good concentration; Motor Strength 5/5 B/L upper and lower extremities; DTRs 2+ intact and symmetric  CHEST/LUNG: Clear to percussion bilaterally; No rales, rhonchi, wheezing, or rubs  HEART: Regular rate and rhythm; No murmurs, rubs, or gallops  ABDOMEN: Soft, Nontender, Nondistended; Bowel sounds present  EXTREMITIES:  2+ Peripheral Pulses, No clubbing, cyanosis, or edema  LYMPH: No lymphadenopathy noted  SKIN: No rashes or lesions    LABS:                        14.2   6.24  )-----------( 197      ( 26 Jan 2021 10:09 )             43.9     01-26    138  |  103  |  10  ----------------------------<  93  3.8   |  28  |  0.70    Ca    7.7<L>      26 Jan 2021 10:09    TPro  7.0  /  Alb  2.7<L>  /  TBili  0.3  /  DBili  .13  /  AST  74<H>  /  ALT  43  /  AlkPhos  88  01-26    PT/INR - ( 24 Jan 2021 22:27 )   PT: 12.9 sec;   INR: 1.12 ratio         PTT - ( 24 Jan 2021 22:27 )  PTT:30.0 sec    CAPILLARY BLOOD GLUCOSE        CULTURES:  Culture Results:   No growth to date. (01-25 @ 08:54)  Culture Results:   No growth to date. (01-25 @ 08:54)    HEMOGLOBIN A1C:    CHOLESTEROL:        RADIOLOGY & ADDITIONAL TESTS:

## 2021-01-27 LAB
ALBUMIN SERPL ELPH-MCNC: 2.5 G/DL — LOW (ref 3.3–5)
ALP SERPL-CCNC: 98 U/L — SIGNIFICANT CHANGE UP (ref 40–120)
ALT FLD-CCNC: 37 U/L — SIGNIFICANT CHANGE UP (ref 12–78)
AST SERPL-CCNC: 58 U/L — HIGH (ref 15–37)
BILIRUB DIRECT SERPL-MCNC: 0.1 MG/DL — SIGNIFICANT CHANGE UP (ref 0.05–0.2)
BILIRUB INDIRECT FLD-MCNC: 0.2 MG/DL — SIGNIFICANT CHANGE UP (ref 0.2–1)
BILIRUB SERPL-MCNC: 0.3 MG/DL — SIGNIFICANT CHANGE UP (ref 0.2–1.2)
CREAT SERPL-MCNC: 0.77 MG/DL — SIGNIFICANT CHANGE UP (ref 0.5–1.3)
D DIMER BLD IA.RAPID-MCNC: 600 NG/ML DDU — HIGH
PROT SERPL-MCNC: 6.6 GM/DL — SIGNIFICANT CHANGE UP (ref 6–8.3)

## 2021-01-27 RX ORDER — ENOXAPARIN SODIUM 100 MG/ML
70 INJECTION SUBCUTANEOUS
Refills: 0 | Status: DISCONTINUED | OUTPATIENT
Start: 2021-01-27 | End: 2021-02-09

## 2021-01-27 RX ADMIN — ENOXAPARIN SODIUM 40 MILLIGRAM(S): 100 INJECTION SUBCUTANEOUS at 04:45

## 2021-01-27 RX ADMIN — AMLODIPINE BESYLATE 5 MILLIGRAM(S): 2.5 TABLET ORAL at 04:45

## 2021-01-27 RX ADMIN — Medication 6 MILLIGRAM(S): at 04:45

## 2021-01-27 RX ADMIN — ENOXAPARIN SODIUM 40 MILLIGRAM(S): 100 INJECTION SUBCUTANEOUS at 16:26

## 2021-01-27 RX ADMIN — REMDESIVIR 500 MILLIGRAM(S): 5 INJECTION INTRAVENOUS at 23:55

## 2021-01-27 RX ADMIN — Medication 6 MILLIGRAM(S): at 21:41

## 2021-01-27 RX ADMIN — Medication 6 MILLIGRAM(S): at 13:07

## 2021-01-27 NOTE — PHARMACY COMMUNICATION NOTE - COMMENTS
Dr. Scott.... thinks patient has microclots even though the CT did not show PE.  Patient is decompensating and full dose anticoagulation is required at this time.

## 2021-01-27 NOTE — PROGRESS NOTE ADULT - SUBJECTIVE AND OBJECTIVE BOX
HPI:  80F Akan speaking  PMHX HTN c/o fever for few days and get  tested COVID-19 (+) 6 days ago but the past few days she is having shortness of breath as well  as worsening dyspnea on exertion now presenting with hypoxia 90% on RA. Pt stated she is feeling tired and weak unable to ambulate. (24 Jan 2021 22:09)      Allergies    No Known Allergies    Intolerances        MEDICATIONS  (STANDING):  amLODIPine   Tablet 5 milliGRAM(s) Oral daily  dexAMETHasone  Injectable 6 milliGRAM(s) IV Push three times a day  enoxaparin Injectable 40 milliGRAM(s) SubCutaneous two times a day  influenza   Vaccine 0.5 milliLiter(s) IntraMuscular once  remdesivir  IVPB 100 milliGRAM(s) IV Intermittent every 24 hours  remdesivir  IVPB   IV Intermittent   sodium chloride 0.45%. 1000 milliLiter(s) (40 mL/Hr) IV Continuous <Continuous>    MEDICATIONS  (PRN):  acetaminophen   Tablet .. 650 milliGRAM(s) Oral every 4 hours PRN Temp greater or equal to 38.5C (101.3F)  ALBUTerol    90 MICROgram(s) HFA Inhaler 2 Puff(s) Inhalation every 4 hours PRN Shortness of Breath and/or Wheezing  benzonatate 100 milliGRAM(s) Oral three times a day PRN Cough      REVIEW OF SYSTEMS:    CONSTITUTIONAL: No fever, chills, weight loss, or fatigue  HEENT: No sore throat, runny nose, ear ache  RESPIRATORY: No cough, wheezing, No shortness of breath  CARDIOVASCULAR: No chest pain, palpitations, dizziness  GASTROINTESTINAL: No abdominal pain. No nausea, vomiting, diarrhea  GENITOURINARY: No dysuria, increase frequency, hematuria, or incontinence  NEUROLOGICAL: No headaches, memory loss, loss of strength, numbness, or tremors, no weakness  EXTREMITY: No pedal edema BLE  SKIN: No itching, burning, rashes, or lesions     VITAL SIGNS:  T(C): 36.5 (01-27-21 @ 10:55), Max: 36.9 (01-26-21 @ 16:06)  T(F): 97.7 (01-27-21 @ 10:55), Max: 98.4 (01-26-21 @ 16:06)  HR: 91 (01-27-21 @ 10:55) (79 - 91)  BP: 133/72 (01-27-21 @ 10:55) (126/79 - 147/77)  RR: 20 (01-27-21 @ 10:55) (18 - 22)  SpO2: 93% (01-27-21 @ 10:55) (85% - 96%)  Wt(kg): --    PHYSICAL EXAM:    GENERAL: not in any distress  HEENT: Neck is supple, normocephalic, atraumatic   CHEST/LUNG: Clear to auscultation bilaterally; No rales, rhonchi, wheezing  HEART: Regular rate and rhythm; No murmurs, rubs, or gallops  ABDOMEN: Soft, Nontender, Nondistended; Bowel sounds present, no rebound   EXTREMITIES:  2+ Peripheral Pulses, No clubbing, cyanosis, or edema  GENITOURINARY:   SKIN: No rashes or lesions  BACK: no pressor sore   NERVOUS SYSTEM:  Alert & Oriented X3, Good concentration  PSYCH: normal affect     LABS:                         14.2   6.24  )-----------( 197      ( 26 Jan 2021 10:09 )             43.9     01-27    x   |  x   |  x   ----------------------------<  x   x    |  x   |  0.77    Ca    7.7<L>      26 Jan 2021 10:09    TPro  6.6  /  Alb  2.5<L>  /  TBili  0.3  /  DBili  .10  /  AST  58<H>  /  ALT  37  /  AlkPhos  98  01-27    LIVER FUNCTIONS - ( 27 Jan 2021 09:05 )  Alb: 2.5 g/dL / Pro: 6.6 gm/dL / ALK PHOS: 98 U/L / ALT: 37 U/L / AST: 58 U/L / GGT: x                                   Culture Results:   No growth to date. (01-25 @ 08:54)  Culture Results:   No growth to date. (01-25 @ 08:54)                Radiology:       HPI:  80F Akan speaking  PMHX HTN c/o fever for few days and get  tested COVID-19 (+) 6 days ago but the past few days she is having shortness of breath as well  as worsening dyspnea on exertion now presenting with hypoxia 90% on RA. Pt stated she is feeling tired and weak unable to ambulate. (24 Jan 2021 22:09)  admitted with covid pn   discussed with PMD   doing worse       Allergies    No Known Allergies    Intolerances        MEDICATIONS  (STANDING):  amLODIPine   Tablet 5 milliGRAM(s) Oral daily  dexAMETHasone  Injectable 6 milliGRAM(s) IV Push three times a day  enoxaparin Injectable 40 milliGRAM(s) SubCutaneous two times a day  influenza   Vaccine 0.5 milliLiter(s) IntraMuscular once  remdesivir  IVPB 100 milliGRAM(s) IV Intermittent every 24 hours  remdesivir  IVPB   IV Intermittent   sodium chloride 0.45%. 1000 milliLiter(s) (40 mL/Hr) IV Continuous <Continuous>    MEDICATIONS  (PRN):  acetaminophen   Tablet .. 650 milliGRAM(s) Oral every 4 hours PRN Temp greater or equal to 38.5C (101.3F)  ALBUTerol    90 MICROgram(s) HFA Inhaler 2 Puff(s) Inhalation every 4 hours PRN Shortness of Breath and/or Wheezing  benzonatate 100 milliGRAM(s) Oral three times a day PRN Cough      REVIEW OF SYSTEMS:    unable to assess  cough plus   more short of breath     VITAL SIGNS:  T(C): 36.5 (01-27-21 @ 10:55), Max: 36.9 (01-26-21 @ 16:06)  T(F): 97.7 (01-27-21 @ 10:55), Max: 98.4 (01-26-21 @ 16:06)  HR: 91 (01-27-21 @ 10:55) (79 - 91)  BP: 133/72 (01-27-21 @ 10:55) (126/79 - 147/77)  RR: 20 (01-27-21 @ 10:55) (18 - 22)  SpO2: 93% (01-27-21 @ 10:55) (85% - 96%)  Wt(kg): --    PHYSICAL EXAM:    GENERAL: on 100 % NRB   HEENT: Neck is supple, normocephalic, atraumatic   CHEST/LUNG: decreased  to auscultation bilaterally;   rhonchi  HEART: Regular rate and rhythm; No murmurs, rubs, or gallops  ABDOMEN: Soft, Nontender, Nondistended; Bowel sounds present, no rebound   EXTREMITIES:  2+ Peripheral Pulses, No clubbing, cyanosis, or edema  SKIN: No rashes or lesions  BACK: no pressor sore   NERVOUS SYSTEM:  Alert & Oriented X3, LABS:                         14.2   6.24  )-----------( 197      ( 26 Jan 2021 10:09 )             43.9     01-27    x   |  x   |  x   ----------------------------<  x   x    |  x   |  0.77    Ca    7.7<L>      26 Jan 2021 10:09    TPro  6.6  /  Alb  2.5<L>  /  TBili  0.3  /  DBili  .10  /  AST  58<H>  /  ALT  37  /  AlkPhos  98  01-27    LIVER FUNCTIONS - ( 27 Jan 2021 09:05 )  Alb: 2.5 g/dL / Pro: 6.6 gm/dL / ALK PHOS: 98 U/L / ALT: 37 U/L / AST: 58 U/L / GGT: x                                   Culture Results:   No growth to date. (01-25 @ 08:54)  Culture Results:   No growth to date. (01-25 @ 08:54)                Radiology:      r< from: CT Angio Chest w/ IV Cont (01.26.21 @ 10:50) >  IMPRESSION:  Negative for pulmonary emboli.  Multifocal bilateral pneumonia presumed Covid 19 infection              RAFIQ CORTEZ MD; Attending Radiologist  This document has been electronically signed. Jan 26 2021 11:15AM    < end of copied text >

## 2021-01-27 NOTE — PROGRESS NOTE ADULT - SUBJECTIVE AND OBJECTIVE BOX
Patient is a 80y old  Female who presents with a chief complaint of sob (26 Jan 2021 18:24)      INTERVAL HPI/OVERNIGHT EVENTS:  pt still sob  MEDICATIONS  (STANDING):  amLODIPine   Tablet 5 milliGRAM(s) Oral daily  dexAMETHasone  Injectable 6 milliGRAM(s) IV Push three times a day  enoxaparin Injectable 40 milliGRAM(s) SubCutaneous two times a day  influenza   Vaccine 0.5 milliLiter(s) IntraMuscular once  remdesivir  IVPB 100 milliGRAM(s) IV Intermittent every 24 hours  remdesivir  IVPB   IV Intermittent   sodium chloride 0.45%. 1000 milliLiter(s) (40 mL/Hr) IV Continuous <Continuous>    MEDICATIONS  (PRN):  acetaminophen   Tablet .. 650 milliGRAM(s) Oral every 4 hours PRN Temp greater or equal to 38.5C (101.3F)  ALBUTerol    90 MICROgram(s) HFA Inhaler 2 Puff(s) Inhalation every 4 hours PRN Shortness of Breath and/or Wheezing  benzonatate 100 milliGRAM(s) Oral three times a day PRN Cough      Allergies    No Known Allergies    Intolerances        REVIEW OF SYSTEMS:  CONSTITUTIONAL: No fever, weight loss, or fatigue  EYES: No eye pain, visual disturbances, or discharge  ENMT:  No difficulty hearing, tinnitus, vertigo; No sinus or throat pain  NECK: No pain or stiffness  BREASTS: No pain, masses, or nipple discharge  RESPIRATORY: No cough, wheezing, chills or hemoptysis; No shortness of breath  CARDIOVASCULAR: No chest pain, palpitations, dizziness, or leg swelling  GASTROINTESTINAL: No abdominal or epigastric pain. No nausea, vomiting, or hematemesis; No diarrhea or constipation. No melena or hematochezia.  GENITOURINARY: No dysuria, frequency, hematuria, or incontinence  NEUROLOGICAL: No headaches, memory loss, loss of strength, numbness, or tremors  SKIN: No itching, burning, rashes, or lesions   LYMPH NODES: No enlarged glands  ENDOCRINE: No heat or cold intolerance; No hair loss  MUSCULOSKELETAL: No joint pain or swelling; No muscle, back, or extremity pain  PSYCHIATRIC: No depression, anxiety, mood swings, or difficulty sleeping  HEME/LYMPH: No easy bruising, or bleeding gums  ALLERGY AND IMMUNOLOGIC: No hives or eczema    Vital Signs Last 24 Hrs  T(C): 36.5 (27 Jan 2021 10:55), Max: 36.9 (26 Jan 2021 16:06)  T(F): 97.7 (27 Jan 2021 10:55), Max: 98.4 (26 Jan 2021 16:06)  HR: 91 (27 Jan 2021 10:55) (79 - 91)  BP: 133/72 (27 Jan 2021 10:55) (126/79 - 147/77)  BP(mean): --  RR: 20 (27 Jan 2021 10:55) (18 - 22)  SpO2: 93% (27 Jan 2021 10:55) (85% - 96%)    PHYSICAL EXAM:  GENERAL: NAD, well-groomed, well-developed  HEAD:  Atraumatic, Normocephalic  EYES: EOMI, PERRLA, conjunctiva and sclera clear  ENMT: No tonsillar erythema, exudates, or enlargement; Moist mucous membranes, Good dentition, No lesions  NECK: Supple, No JVD, Normal thyroid  NERVOUS SYSTEM:  Alert & Oriented X3, Good concentration; Motor Strength 5/5 B/L upper and lower extremities; DTRs 2+ intact and symmetric  CHEST/LUNG: Clear to percussion bilaterally; No rales, rhonchi, wheezing, or rubs  HEART: Regular rate and rhythm; No murmurs, rubs, or gallops  ABDOMEN: Soft, Nontender, Nondistended; Bowel sounds present  EXTREMITIES:  2+ Peripheral Pulses, No clubbing, cyanosis, or edema  LYMPH: No lymphadenopathy noted  SKIN: No rashes or lesions    LABS:                        14.2   6.24  )-----------( 197      ( 26 Jan 2021 10:09 )             43.9     01-27    x   |  x   |  x   ----------------------------<  x   x    |  x   |  0.77    Ca    7.7<L>      26 Jan 2021 10:09    TPro  6.6  /  Alb  2.5<L>  /  TBili  0.3  /  DBili  .10  /  AST  58<H>  /  ALT  37  /  AlkPhos  98  01-27        CAPILLARY BLOOD GLUCOSE        CULTURES:  Culture Results:   No growth to date. (01-25 @ 08:54)  Culture Results:   No growth to date. (01-25 @ 08:54)    HEMOGLOBIN A1C:    CHOLESTEROL:        RADIOLOGY & ADDITIONAL TESTS:

## 2021-01-28 LAB
ALBUMIN SERPL ELPH-MCNC: 2.4 G/DL — LOW (ref 3.3–5)
ALBUMIN SERPL ELPH-MCNC: 2.4 G/DL — LOW (ref 3.3–5)
ALP SERPL-CCNC: 100 U/L — SIGNIFICANT CHANGE UP (ref 40–120)
ALP SERPL-CCNC: 118 U/L — SIGNIFICANT CHANGE UP (ref 40–120)
ALT FLD-CCNC: 35 U/L — SIGNIFICANT CHANGE UP (ref 12–78)
ALT FLD-CCNC: 35 U/L — SIGNIFICANT CHANGE UP (ref 12–78)
ANION GAP SERPL CALC-SCNC: 4 MMOL/L — LOW (ref 5–17)
AST SERPL-CCNC: 41 U/L — HIGH (ref 15–37)
AST SERPL-CCNC: 42 U/L — HIGH (ref 15–37)
BILIRUB DIRECT SERPL-MCNC: 0.1 MG/DL — SIGNIFICANT CHANGE UP (ref 0.05–0.2)
BILIRUB INDIRECT FLD-MCNC: 0.2 MG/DL — SIGNIFICANT CHANGE UP (ref 0.2–1)
BILIRUB SERPL-MCNC: 0.3 MG/DL — SIGNIFICANT CHANGE UP (ref 0.2–1.2)
BILIRUB SERPL-MCNC: 0.4 MG/DL — SIGNIFICANT CHANGE UP (ref 0.2–1.2)
BUN SERPL-MCNC: 12 MG/DL — SIGNIFICANT CHANGE UP (ref 7–23)
CALCIUM SERPL-MCNC: 7.8 MG/DL — LOW (ref 8.5–10.1)
CHLORIDE SERPL-SCNC: 104 MMOL/L — SIGNIFICANT CHANGE UP (ref 96–108)
CO2 SERPL-SCNC: 28 MMOL/L — SIGNIFICANT CHANGE UP (ref 22–31)
CREAT SERPL-MCNC: 0.71 MG/DL — SIGNIFICANT CHANGE UP (ref 0.5–1.3)
CREAT SERPL-MCNC: 0.73 MG/DL — SIGNIFICANT CHANGE UP (ref 0.5–1.3)
CRP SERPL-MCNC: 5.4 MG/DL — HIGH (ref 0–0.4)
FERRITIN SERPL-MCNC: 446 NG/ML — HIGH (ref 15–150)
GLUCOSE SERPL-MCNC: 143 MG/DL — HIGH (ref 70–99)
HCT VFR BLD CALC: 41.5 % — SIGNIFICANT CHANGE UP (ref 34.5–45)
HGB BLD-MCNC: 13.6 G/DL — SIGNIFICANT CHANGE UP (ref 11.5–15.5)
LDH SERPL L TO P-CCNC: 669 U/L — HIGH (ref 50–242)
MCHC RBC-ENTMCNC: 29.2 PG — SIGNIFICANT CHANGE UP (ref 27–34)
MCHC RBC-ENTMCNC: 32.8 GM/DL — SIGNIFICANT CHANGE UP (ref 32–36)
MCV RBC AUTO: 89.2 FL — SIGNIFICANT CHANGE UP (ref 80–100)
NRBC # BLD: 0 /100 WBCS — SIGNIFICANT CHANGE UP (ref 0–0)
PLATELET # BLD AUTO: 257 K/UL — SIGNIFICANT CHANGE UP (ref 150–400)
POTASSIUM SERPL-MCNC: 3.7 MMOL/L — SIGNIFICANT CHANGE UP (ref 3.5–5.3)
POTASSIUM SERPL-SCNC: 3.7 MMOL/L — SIGNIFICANT CHANGE UP (ref 3.5–5.3)
PROCALCITONIN SERPL-MCNC: 0.03 NG/ML — SIGNIFICANT CHANGE UP (ref 0.02–0.1)
PROT SERPL-MCNC: 6.3 GM/DL — SIGNIFICANT CHANGE UP (ref 6–8.3)
PROT SERPL-MCNC: 6.4 GM/DL — SIGNIFICANT CHANGE UP (ref 6–8.3)
RBC # BLD: 4.65 M/UL — SIGNIFICANT CHANGE UP (ref 3.8–5.2)
RBC # FLD: 13.6 % — SIGNIFICANT CHANGE UP (ref 10.3–14.5)
SODIUM SERPL-SCNC: 136 MMOL/L — SIGNIFICANT CHANGE UP (ref 135–145)
WBC # BLD: 7.99 K/UL — SIGNIFICANT CHANGE UP (ref 3.8–10.5)
WBC # FLD AUTO: 7.99 K/UL — SIGNIFICANT CHANGE UP (ref 3.8–10.5)

## 2021-01-28 RX ORDER — ASCORBIC ACID 60 MG
500 TABLET,CHEWABLE ORAL
Refills: 0 | Status: DISCONTINUED | OUTPATIENT
Start: 2021-01-28 | End: 2021-02-09

## 2021-01-28 RX ORDER — ZINC SULFATE TAB 220 MG (50 MG ZINC EQUIVALENT) 220 (50 ZN) MG
220 TAB ORAL DAILY
Refills: 0 | Status: DISCONTINUED | OUTPATIENT
Start: 2021-01-28 | End: 2021-02-09

## 2021-01-28 RX ADMIN — ENOXAPARIN SODIUM 70 MILLIGRAM(S): 100 INJECTION SUBCUTANEOUS at 05:38

## 2021-01-28 RX ADMIN — Medication 6 MILLIGRAM(S): at 12:19

## 2021-01-28 RX ADMIN — Medication 500 MILLIGRAM(S): at 17:09

## 2021-01-28 RX ADMIN — ZINC SULFATE TAB 220 MG (50 MG ZINC EQUIVALENT) 220 MILLIGRAM(S): 220 (50 ZN) TAB at 17:09

## 2021-01-28 RX ADMIN — REMDESIVIR 500 MILLIGRAM(S): 5 INJECTION INTRAVENOUS at 22:55

## 2021-01-28 RX ADMIN — AMLODIPINE BESYLATE 5 MILLIGRAM(S): 2.5 TABLET ORAL at 05:38

## 2021-01-28 RX ADMIN — Medication 6 MILLIGRAM(S): at 05:38

## 2021-01-28 RX ADMIN — Medication 6 MILLIGRAM(S): at 22:54

## 2021-01-28 RX ADMIN — ENOXAPARIN SODIUM 70 MILLIGRAM(S): 100 INJECTION SUBCUTANEOUS at 17:06

## 2021-01-28 NOTE — PROGRESS NOTE ADULT - SUBJECTIVE AND OBJECTIVE BOX
HPI:  80F Akan speaking  PMHX HTN c/o fever for few days and get  tested COVID-19 (+) 6 days ago but the past few days she is having shortness of breath as well  as worsening dyspnea on exertion now presenting with hypoxia 90% on RA. Pt stated she is feeling tired and weak unable to ambulate. (24 Jan 2021 22:09)      Allergies    No Known Allergies    Intolerances        MEDICATIONS  (STANDING):  amLODIPine   Tablet 5 milliGRAM(s) Oral daily  dexAMETHasone  Injectable 6 milliGRAM(s) IV Push three times a day  enoxaparin Injectable 70 milliGRAM(s) SubCutaneous two times a day  influenza   Vaccine 0.5 milliLiter(s) IntraMuscular once  remdesivir  IVPB 100 milliGRAM(s) IV Intermittent every 24 hours  remdesivir  IVPB   IV Intermittent     MEDICATIONS  (PRN):  acetaminophen   Tablet .. 650 milliGRAM(s) Oral every 4 hours PRN Temp greater or equal to 38.5C (101.3F)  ALBUTerol    90 MICROgram(s) HFA Inhaler 2 Puff(s) Inhalation every 4 hours PRN Shortness of Breath and/or Wheezing  benzonatate 100 milliGRAM(s) Oral three times a day PRN Cough      REVIEW OF SYSTEMS:    CONSTITUTIONAL: No fever, chills, weight loss, or fatigue  HEENT: No sore throat, runny nose, ear ache  RESPIRATORY: No cough, wheezing, No shortness of breath  CARDIOVASCULAR: No chest pain, palpitations, dizziness  GASTROINTESTINAL: No abdominal pain. No nausea, vomiting, diarrhea  GENITOURINARY: No dysuria, increase frequency, hematuria, or incontinence  NEUROLOGICAL: No headaches, memory loss, loss of strength, numbness, or tremors, no weakness  EXTREMITY: No pedal edema BLE  SKIN: No itching, burning, rashes, or lesions     VITAL SIGNS:  T(C): 36.7 (01-28-21 @ 10:50), Max: 36.8 (01-27-21 @ 15:59)  T(F): 98 (01-28-21 @ 10:50), Max: 98.2 (01-27-21 @ 15:59)  HR: 90 (01-28-21 @ 10:50) (84 - 92)  BP: 136/80 (01-28-21 @ 10:50) (121/76 - 142/81)  RR: 19 (01-28-21 @ 10:50) (18 - 22)  SpO2: 94% (01-28-21 @ 10:50) (92% - 94%)  Wt(kg): --    PHYSICAL EXAM:    GENERAL: Alert, oriented x3, acutely ill  HEENT: TRISHA, EOMI, NRB  CHEST/LUNG: Clear to ausculation B/L  HEART: RRR, s1/s2 present  ABDOMEN: BS present, soft, depressible, no tender  EXTREMITIES: No edema    LABS:                         13.6   7.99  )-----------( 257      ( 28 Jan 2021 08:29 )             41.5     01-28    136  |  104  |  12  ----------------------------<  143<H>  3.7   |  28  |  0.73    Ca    7.8<L>      28 Jan 2021 08:29    TPro  6.4  /  Alb  2.4<L>  /  TBili  0.4  /  DBili  .10  /  AST  42<H>  /  ALT  35  /  AlkPhos  100  01-28    LIVER FUNCTIONS - ( 28 Jan 2021 08:29 )  Alb: 2.4 g/dL / Pro: 6.4 gm/dL / ALK PHOS: 100 U/L / ALT: 35 U/L / AST: 42 U/L / GGT: x               Culture Results:   No growth to date. (01-25 @ 08:54)  Culture Results:   No growth to date. (01-25 @ 08:54)        Radiology:

## 2021-01-28 NOTE — PROGRESS NOTE ADULT - SUBJECTIVE AND OBJECTIVE BOX
Patient is a 80y old  Female who presents with a chief complaint of sob (27 Jan 2021 12:44)      INTERVAL HPI/OVERNIGHT EVENTS:  pt still on non rebreather  MEDICATIONS  (STANDING):  amLODIPine   Tablet 5 milliGRAM(s) Oral daily  dexAMETHasone  Injectable 6 milliGRAM(s) IV Push three times a day  enoxaparin Injectable 70 milliGRAM(s) SubCutaneous two times a day  influenza   Vaccine 0.5 milliLiter(s) IntraMuscular once  remdesivir  IVPB 100 milliGRAM(s) IV Intermittent every 24 hours  remdesivir  IVPB   IV Intermittent     MEDICATIONS  (PRN):  acetaminophen   Tablet .. 650 milliGRAM(s) Oral every 4 hours PRN Temp greater or equal to 38.5C (101.3F)  ALBUTerol    90 MICROgram(s) HFA Inhaler 2 Puff(s) Inhalation every 4 hours PRN Shortness of Breath and/or Wheezing  benzonatate 100 milliGRAM(s) Oral three times a day PRN Cough      Allergies    No Known Allergies    Intolerances        REVIEW OF SYSTEMS:  CONSTITUTIONAL: No fever, weight loss, or fatigue  EYES: No eye pain, visual disturbances, or discharge  ENMT:  No difficulty hearing, tinnitus, vertigo; No sinus or throat pain  NECK: No pain or stiffness  BREASTS: No pain, masses, or nipple discharge  RESPIRATORY: No cough, wheezing, chills or hemoptysis; No shortness of breath  CARDIOVASCULAR: No chest pain, palpitations, dizziness, or leg swelling  GASTROINTESTINAL: No abdominal or epigastric pain. No nausea, vomiting, or hematemesis; No diarrhea or constipation. No melena or hematochezia.  GENITOURINARY: No dysuria, frequency, hematuria, or incontinence  NEUROLOGICAL: No headaches, memory loss, loss of strength, numbness, or tremors  SKIN: No itching, burning, rashes, or lesions   LYMPH NODES: No enlarged glands  ENDOCRINE: No heat or cold intolerance; No hair loss  MUSCULOSKELETAL: No joint pain or swelling; No muscle, back, or extremity pain  PSYCHIATRIC: No depression, anxiety, mood swings, or difficulty sleeping  HEME/LYMPH: No easy bruising, or bleeding gums  ALLERGY AND IMMUNOLOGIC: No hives or eczema    Vital Signs Last 24 Hrs  T(C): 36.7 (28 Jan 2021 10:50), Max: 36.8 (27 Jan 2021 15:59)  T(F): 98 (28 Jan 2021 10:50), Max: 98.2 (27 Jan 2021 15:59)  HR: 90 (28 Jan 2021 10:50) (84 - 92)  BP: 136/80 (28 Jan 2021 10:50) (121/76 - 142/81)  BP(mean): 83 (28 Jan 2021 10:50) (83 - 83)  RR: 19 (28 Jan 2021 10:50) (18 - 22)  SpO2: 94% (28 Jan 2021 10:50) (92% - 94%)    PHYSICAL EXAM:  GENERAL: NAD, well-groomed, well-developed  HEAD:  Atraumatic, Normocephalic  EYES: EOMI, PERRLA, conjunctiva and sclera clear  ENMT: No tonsillar erythema, exudates, or enlargement; Moist mucous membranes, Good dentition, No lesions  NECK: Supple, No JVD, Normal thyroid  NERVOUS SYSTEM:  Alert & Oriented X3, Good concentration; Motor Strength 5/5 B/L upper and lower extremities; DTRs 2+ intact and symmetric  CHEST/LUNG: Clear to percussion bilaterally; No rales, rhonchi, wheezing, or rubs  HEART: Regular rate and rhythm; No murmurs, rubs, or gallops  ABDOMEN: Soft, Nontender, Nondistended; Bowel sounds present  EXTREMITIES:  2+ Peripheral Pulses, No clubbing, cyanosis, or edema  LYMPH: No lymphadenopathy noted  SKIN: No rashes or lesions    LABS:                        13.6   7.99  )-----------( 257      ( 28 Jan 2021 08:29 )             41.5     01-28    136  |  104  |  12  ----------------------------<  143<H>  3.7   |  28  |  0.73    Ca    7.8<L>      28 Jan 2021 08:29    TPro  6.4  /  Alb  2.4<L>  /  TBili  0.4  /  DBili  .10  /  AST  42<H>  /  ALT  35  /  AlkPhos  100  01-28        CAPILLARY BLOOD GLUCOSE        CULTURES:    HEMOGLOBIN A1C:    CHOLESTEROL:        RADIOLOGY & ADDITIONAL TESTS:

## 2021-01-29 LAB
ALBUMIN SERPL ELPH-MCNC: 2.3 G/DL — LOW (ref 3.3–5)
ALBUMIN SERPL ELPH-MCNC: 2.4 G/DL — LOW (ref 3.3–5)
ALP SERPL-CCNC: 114 U/L — SIGNIFICANT CHANGE UP (ref 40–120)
ALP SERPL-CCNC: 119 U/L — SIGNIFICANT CHANGE UP (ref 40–120)
ALT FLD-CCNC: 33 U/L — SIGNIFICANT CHANGE UP (ref 12–78)
ALT FLD-CCNC: 33 U/L — SIGNIFICANT CHANGE UP (ref 12–78)
ANION GAP SERPL CALC-SCNC: 5 MMOL/L — SIGNIFICANT CHANGE UP (ref 5–17)
AST SERPL-CCNC: 31 U/L — SIGNIFICANT CHANGE UP (ref 15–37)
AST SERPL-CCNC: 33 U/L — SIGNIFICANT CHANGE UP (ref 15–37)
BILIRUB DIRECT SERPL-MCNC: 0.12 MG/DL — SIGNIFICANT CHANGE UP (ref 0.05–0.2)
BILIRUB DIRECT SERPL-MCNC: 0.15 MG/DL — SIGNIFICANT CHANGE UP (ref 0.05–0.2)
BILIRUB INDIRECT FLD-MCNC: 0.2 MG/DL — SIGNIFICANT CHANGE UP (ref 0.2–1)
BILIRUB INDIRECT FLD-MCNC: 0.2 MG/DL — SIGNIFICANT CHANGE UP (ref 0.2–1)
BILIRUB SERPL-MCNC: 0.3 MG/DL — SIGNIFICANT CHANGE UP (ref 0.2–1.2)
BILIRUB SERPL-MCNC: 0.4 MG/DL — SIGNIFICANT CHANGE UP (ref 0.2–1.2)
BUN SERPL-MCNC: 15 MG/DL — SIGNIFICANT CHANGE UP (ref 7–23)
CALCIUM SERPL-MCNC: 7.6 MG/DL — LOW (ref 8.5–10.1)
CHLORIDE SERPL-SCNC: 104 MMOL/L — SIGNIFICANT CHANGE UP (ref 96–108)
CO2 SERPL-SCNC: 29 MMOL/L — SIGNIFICANT CHANGE UP (ref 22–31)
CREAT SERPL-MCNC: 0.78 MG/DL — SIGNIFICANT CHANGE UP (ref 0.5–1.3)
CREAT SERPL-MCNC: 0.87 MG/DL — SIGNIFICANT CHANGE UP (ref 0.5–1.3)
CRP SERPL-MCNC: 3.26 MG/DL — HIGH (ref 0–0.4)
GLUCOSE SERPL-MCNC: 166 MG/DL — HIGH (ref 70–99)
HCT VFR BLD CALC: 41.4 % — SIGNIFICANT CHANGE UP (ref 34.5–45)
HGB BLD-MCNC: 13.8 G/DL — SIGNIFICANT CHANGE UP (ref 11.5–15.5)
MCHC RBC-ENTMCNC: 29.4 PG — SIGNIFICANT CHANGE UP (ref 27–34)
MCHC RBC-ENTMCNC: 33.3 GM/DL — SIGNIFICANT CHANGE UP (ref 32–36)
MCV RBC AUTO: 88.3 FL — SIGNIFICANT CHANGE UP (ref 80–100)
NRBC # BLD: 0 /100 WBCS — SIGNIFICANT CHANGE UP (ref 0–0)
PLATELET # BLD AUTO: 290 K/UL — SIGNIFICANT CHANGE UP (ref 150–400)
POTASSIUM SERPL-MCNC: 3.7 MMOL/L — SIGNIFICANT CHANGE UP (ref 3.5–5.3)
POTASSIUM SERPL-SCNC: 3.7 MMOL/L — SIGNIFICANT CHANGE UP (ref 3.5–5.3)
PROT SERPL-MCNC: 6.3 GM/DL — SIGNIFICANT CHANGE UP (ref 6–8.3)
PROT SERPL-MCNC: 6.5 GM/DL — SIGNIFICANT CHANGE UP (ref 6–8.3)
RBC # BLD: 4.69 M/UL — SIGNIFICANT CHANGE UP (ref 3.8–5.2)
RBC # FLD: 13.9 % — SIGNIFICANT CHANGE UP (ref 10.3–14.5)
SODIUM SERPL-SCNC: 138 MMOL/L — SIGNIFICANT CHANGE UP (ref 135–145)
WBC # BLD: 8.05 K/UL — SIGNIFICANT CHANGE UP (ref 3.8–10.5)
WBC # FLD AUTO: 8.05 K/UL — SIGNIFICANT CHANGE UP (ref 3.8–10.5)

## 2021-01-29 RX ORDER — REMDESIVIR 5 MG/ML
100 INJECTION INTRAVENOUS EVERY 24 HOURS
Refills: 0 | Status: COMPLETED | OUTPATIENT
Start: 2021-01-29 | End: 2021-02-02

## 2021-01-29 RX ADMIN — ENOXAPARIN SODIUM 70 MILLIGRAM(S): 100 INJECTION SUBCUTANEOUS at 18:07

## 2021-01-29 RX ADMIN — Medication 6 MILLIGRAM(S): at 21:38

## 2021-01-29 RX ADMIN — Medication 6 MILLIGRAM(S): at 05:37

## 2021-01-29 RX ADMIN — Medication 500 MILLIGRAM(S): at 18:06

## 2021-01-29 RX ADMIN — ENOXAPARIN SODIUM 70 MILLIGRAM(S): 100 INJECTION SUBCUTANEOUS at 05:36

## 2021-01-29 RX ADMIN — AMLODIPINE BESYLATE 5 MILLIGRAM(S): 2.5 TABLET ORAL at 05:37

## 2021-01-29 RX ADMIN — Medication 500 MILLIGRAM(S): at 05:37

## 2021-01-29 RX ADMIN — Medication 6 MILLIGRAM(S): at 13:20

## 2021-01-29 RX ADMIN — ZINC SULFATE TAB 220 MG (50 MG ZINC EQUIVALENT) 220 MILLIGRAM(S): 220 (50 ZN) TAB at 13:20

## 2021-01-29 RX ADMIN — REMDESIVIR 500 MILLIGRAM(S): 5 INJECTION INTRAVENOUS at 21:41

## 2021-01-29 NOTE — PROGRESS NOTE ADULT - SUBJECTIVE AND OBJECTIVE BOX
HPI:  80F Akan speaking  PMHX HTN c/o fever for few days and get  tested COVID-19 (+) 6 days ago but the past few days she is having shortness of breath as well  as worsening dyspnea on exertion now presenting with hypoxia 90% on RA. Pt stated she is feeling tired and weak unable to ambulate. (24 Jan 2021 22:09)      Allergies    No Known Allergies    Intolerances        MEDICATIONS  (STANDING):  amLODIPine   Tablet 5 milliGRAM(s) Oral daily  ascorbic acid 500 milliGRAM(s) Oral two times a day  dexAMETHasone  Injectable 6 milliGRAM(s) IV Push three times a day  enoxaparin Injectable 70 milliGRAM(s) SubCutaneous two times a day  influenza   Vaccine 0.5 milliLiter(s) IntraMuscular once  zinc sulfate 220 milliGRAM(s) Oral daily    MEDICATIONS  (PRN):  acetaminophen   Tablet .. 650 milliGRAM(s) Oral every 4 hours PRN Temp greater or equal to 38.5C (101.3F)  ALBUTerol    90 MICROgram(s) HFA Inhaler 2 Puff(s) Inhalation every 4 hours PRN Shortness of Breath and/or Wheezing  benzonatate 100 milliGRAM(s) Oral three times a day PRN Cough      REVIEW OF SYSTEMS:    CONSTITUTIONAL: No fever, chills, weight loss, or fatigue  HEENT: No sore throat, runny nose, ear ache  RESPIRATORY: No cough, wheezing, No shortness of breath  CARDIOVASCULAR: No chest pain, palpitations, dizziness  GASTROINTESTINAL: No abdominal pain. No nausea, vomiting, diarrhea  GENITOURINARY: No dysuria, increase frequency, hematuria, or incontinence  NEUROLOGICAL: No headaches, memory loss, loss of strength, numbness, or tremors, no weakness  EXTREMITY: No pedal edema BLE  SKIN: No itching, burning, rashes, or lesions     VITAL SIGNS:  T(C): 36.3 (01-29-21 @ 11:35), Max: 36.4 (01-28-21 @ 16:53)  T(F): 97.4 (01-29-21 @ 11:35), Max: 97.6 (01-28-21 @ 16:53)  HR: 86 (01-29-21 @ 12:00) (66 - 86)  BP: 133/79 (01-29-21 @ 11:35) (130/82 - 141/83)  RR: 20 (01-29-21 @ 12:00) (18 - 20)  SpO2: 95% (01-29-21 @ 12:00) (95% - 98%)  Wt(kg): --    PHYSICAL EXAM:    GENERAL: not in any distress  HEENT: Neck is supple, normocephalic, atraumatic   CHEST/LUNG: Clear to percussion bilaterally; No rales, rhonchi, wheezing  HEART: Regular rate and rhythm; No murmurs, rubs, or gallops  ABDOMEN: Soft, Nontender, Nondistended; Bowel sounds present, no rebound   EXTREMITIES:  2+ Peripheral Pulses, No clubbing, cyanosis, or edema  GENITOURINARY:   SKIN: No rashes or lesions  BACK: no pressor sore   NERVOUS SYSTEM:  Alert & Oriented X3, Good concentration  PSYCH: normal affect     LABS:                         13.8   8.05  )-----------( 290      ( 29 Jan 2021 06:41 )             41.4     01-29    138  |  104  |  15  ----------------------------<  166<H>  3.7   |  29  |  0.78    Ca    7.6<L>      29 Jan 2021 06:41    TPro  6.3  /  Alb  2.3<L>  /  TBili  0.4  /  DBili  .15  /  AST  33  /  ALT  33  /  AlkPhos  114  01-29    LIVER FUNCTIONS - ( 29 Jan 2021 06:41 )  Alb: 2.3 g/dL / Pro: 6.3 gm/dL / ALK PHOS: 114 U/L / ALT: 33 U/L / AST: 33 U/L / GGT: x             Culture Results:   No growth to date. (01-25 @ 08:54)  Culture Results:   No growth to date. (01-25 @ 08:54)        Radiology:

## 2021-01-29 NOTE — PROGRESS NOTE ADULT - SUBJECTIVE AND OBJECTIVE BOX
Patient is a 80y old  Female who presents with a chief complaint of sob (28 Jan 2021 20:31)      INTERVAL HPI/OVERNIGHT EVENTS:  pt remain stable  MEDICATIONS  (STANDING):  amLODIPine   Tablet 5 milliGRAM(s) Oral daily  ascorbic acid 500 milliGRAM(s) Oral two times a day  dexAMETHasone  Injectable 6 milliGRAM(s) IV Push three times a day  enoxaparin Injectable 70 milliGRAM(s) SubCutaneous two times a day  influenza   Vaccine 0.5 milliLiter(s) IntraMuscular once  zinc sulfate 220 milliGRAM(s) Oral daily    MEDICATIONS  (PRN):  acetaminophen   Tablet .. 650 milliGRAM(s) Oral every 4 hours PRN Temp greater or equal to 38.5C (101.3F)  ALBUTerol    90 MICROgram(s) HFA Inhaler 2 Puff(s) Inhalation every 4 hours PRN Shortness of Breath and/or Wheezing  benzonatate 100 milliGRAM(s) Oral three times a day PRN Cough      Allergies    No Known Allergies    Intolerances        REVIEW OF SYSTEMS:  CONSTITUTIONAL: No fever, weight loss, or fatigue  EYES: No eye pain, visual disturbances, or discharge  ENMT:  No difficulty hearing, tinnitus, vertigo; No sinus or throat pain  NECK: No pain or stiffness  BREASTS: No pain, masses, or nipple discharge  RESPIRATORY: No cough, wheezing, chills or hemoptysis; No shortness of breath  CARDIOVASCULAR: No chest pain, palpitations, dizziness, or leg swelling  GASTROINTESTINAL: No abdominal or epigastric pain. No nausea, vomiting, or hematemesis; No diarrhea or constipation. No melena or hematochezia.  GENITOURINARY: No dysuria, frequency, hematuria, or incontinence  NEUROLOGICAL: No headaches, memory loss, loss of strength, numbness, or tremors  SKIN: No itching, burning, rashes, or lesions   LYMPH NODES: No enlarged glands  ENDOCRINE: No heat or cold intolerance; No hair loss  MUSCULOSKELETAL: No joint pain or swelling; No muscle, back, or extremity pain  PSYCHIATRIC: No depression, anxiety, mood swings, or difficulty sleeping  HEME/LYMPH: No easy bruising, or bleeding gums  ALLERGY AND IMMUNOLOGIC: No hives or eczema    Vital Signs Last 24 Hrs  T(C): 36.3 (29 Jan 2021 05:27), Max: 36.4 (28 Jan 2021 16:53)  T(F): 97.4 (29 Jan 2021 05:27), Max: 97.6 (28 Jan 2021 16:53)  HR: 82 (29 Jan 2021 05:27) (66 - 82)  BP: 141/83 (29 Jan 2021 05:27) (130/82 - 141/83)  BP(mean): --  RR: 19 (29 Jan 2021 05:27) (18 - 19)  SpO2: 96% (29 Jan 2021 05:27) (96% - 96%)    PHYSICAL EXAM:  GENERAL: NAD, well-groomed, well-developed  HEAD:  Atraumatic, Normocephalic  EYES: EOMI, PERRLA, conjunctiva and sclera clear  ENMT: No tonsillar erythema, exudates, or enlargement; Moist mucous membranes, Good dentition, No lesions  NECK: Supple, No JVD, Normal thyroid  NERVOUS SYSTEM:  Alert & Oriented X3, Good concentration; Motor Strength 5/5 B/L upper and lower extremities; DTRs 2+ intact and symmetric  CHEST/LUNG: Clear to percussion bilaterally; No rales, rhonchi, wheezing, or rubs  HEART: Regular rate and rhythm; No murmurs, rubs, or gallops  ABDOMEN: Soft, Nontender, Nondistended; Bowel sounds present  EXTREMITIES:  2+ Peripheral Pulses, No clubbing, cyanosis, or edema  LYMPH: No lymphadenopathy noted  SKIN: No rashes or lesions    LABS:                        13.8   8.05  )-----------( 290      ( 29 Jan 2021 06:41 )             41.4     01-29    138  |  104  |  15  ----------------------------<  166<H>  3.7   |  29  |  0.78    Ca    7.6<L>      29 Jan 2021 06:41    TPro  6.3  /  Alb  2.3<L>  /  TBili  0.4  /  DBili  .15  /  AST  33  /  ALT  33  /  AlkPhos  114  01-29        CAPILLARY BLOOD GLUCOSE        CULTURES:    HEMOGLOBIN A1C:    CHOLESTEROL:        RADIOLOGY & ADDITIONAL TESTS:

## 2021-01-30 LAB
ALBUMIN SERPL ELPH-MCNC: 2.2 G/DL — LOW (ref 3.3–5)
ALBUMIN SERPL ELPH-MCNC: 2.3 G/DL — LOW (ref 3.3–5)
ALP SERPL-CCNC: 116 U/L — SIGNIFICANT CHANGE UP (ref 40–120)
ALP SERPL-CCNC: 125 U/L — HIGH (ref 40–120)
ALT FLD-CCNC: 30 U/L — SIGNIFICANT CHANGE UP (ref 12–78)
ALT FLD-CCNC: 30 U/L — SIGNIFICANT CHANGE UP (ref 12–78)
ANION GAP SERPL CALC-SCNC: 3 MMOL/L — LOW (ref 5–17)
AST SERPL-CCNC: 28 U/L — SIGNIFICANT CHANGE UP (ref 15–37)
AST SERPL-CCNC: 29 U/L — SIGNIFICANT CHANGE UP (ref 15–37)
BILIRUB DIRECT SERPL-MCNC: 0.12 MG/DL — SIGNIFICANT CHANGE UP (ref 0.05–0.2)
BILIRUB INDIRECT FLD-MCNC: 0.2 MG/DL — SIGNIFICANT CHANGE UP (ref 0.2–1)
BILIRUB SERPL-MCNC: 0.3 MG/DL — SIGNIFICANT CHANGE UP (ref 0.2–1.2)
BILIRUB SERPL-MCNC: 0.4 MG/DL — SIGNIFICANT CHANGE UP (ref 0.2–1.2)
BUN SERPL-MCNC: 17 MG/DL — SIGNIFICANT CHANGE UP (ref 7–23)
CALCIUM SERPL-MCNC: 7.7 MG/DL — LOW (ref 8.5–10.1)
CHLORIDE SERPL-SCNC: 106 MMOL/L — SIGNIFICANT CHANGE UP (ref 96–108)
CO2 SERPL-SCNC: 29 MMOL/L — SIGNIFICANT CHANGE UP (ref 22–31)
CREAT SERPL-MCNC: 0.77 MG/DL — SIGNIFICANT CHANGE UP (ref 0.5–1.3)
CREAT SERPL-MCNC: 0.81 MG/DL — SIGNIFICANT CHANGE UP (ref 0.5–1.3)
CRP SERPL-MCNC: 1.87 MG/DL — HIGH (ref 0–0.4)
CULTURE RESULTS: SIGNIFICANT CHANGE UP
CULTURE RESULTS: SIGNIFICANT CHANGE UP
GLUCOSE SERPL-MCNC: 168 MG/DL — HIGH (ref 70–99)
HCT VFR BLD CALC: 40.2 % — SIGNIFICANT CHANGE UP (ref 34.5–45)
HGB BLD-MCNC: 13.5 G/DL — SIGNIFICANT CHANGE UP (ref 11.5–15.5)
MCHC RBC-ENTMCNC: 29.7 PG — SIGNIFICANT CHANGE UP (ref 27–34)
MCHC RBC-ENTMCNC: 33.6 GM/DL — SIGNIFICANT CHANGE UP (ref 32–36)
MCV RBC AUTO: 88.4 FL — SIGNIFICANT CHANGE UP (ref 80–100)
NRBC # BLD: 0 /100 WBCS — SIGNIFICANT CHANGE UP (ref 0–0)
PLATELET # BLD AUTO: 313 K/UL — SIGNIFICANT CHANGE UP (ref 150–400)
POTASSIUM SERPL-MCNC: 4 MMOL/L — SIGNIFICANT CHANGE UP (ref 3.5–5.3)
POTASSIUM SERPL-SCNC: 4 MMOL/L — SIGNIFICANT CHANGE UP (ref 3.5–5.3)
PROT SERPL-MCNC: 6.2 GM/DL — SIGNIFICANT CHANGE UP (ref 6–8.3)
PROT SERPL-MCNC: 6.3 GM/DL — SIGNIFICANT CHANGE UP (ref 6–8.3)
RBC # BLD: 4.55 M/UL — SIGNIFICANT CHANGE UP (ref 3.8–5.2)
RBC # FLD: 13.9 % — SIGNIFICANT CHANGE UP (ref 10.3–14.5)
SODIUM SERPL-SCNC: 138 MMOL/L — SIGNIFICANT CHANGE UP (ref 135–145)
SPECIMEN SOURCE: SIGNIFICANT CHANGE UP
SPECIMEN SOURCE: SIGNIFICANT CHANGE UP
WBC # BLD: 6.86 K/UL — SIGNIFICANT CHANGE UP (ref 3.8–10.5)
WBC # FLD AUTO: 6.86 K/UL — SIGNIFICANT CHANGE UP (ref 3.8–10.5)

## 2021-01-30 RX ORDER — PANTOPRAZOLE SODIUM 20 MG/1
40 TABLET, DELAYED RELEASE ORAL
Refills: 0 | Status: DISCONTINUED | OUTPATIENT
Start: 2021-01-30 | End: 2021-02-09

## 2021-01-30 RX ADMIN — Medication 6 MILLIGRAM(S): at 21:45

## 2021-01-30 RX ADMIN — Medication 6 MILLIGRAM(S): at 06:16

## 2021-01-30 RX ADMIN — Medication 500 MILLIGRAM(S): at 06:16

## 2021-01-30 RX ADMIN — ZINC SULFATE TAB 220 MG (50 MG ZINC EQUIVALENT) 220 MILLIGRAM(S): 220 (50 ZN) TAB at 12:27

## 2021-01-30 RX ADMIN — REMDESIVIR 500 MILLIGRAM(S): 5 INJECTION INTRAVENOUS at 21:59

## 2021-01-30 RX ADMIN — AMLODIPINE BESYLATE 5 MILLIGRAM(S): 2.5 TABLET ORAL at 06:16

## 2021-01-30 RX ADMIN — Medication 6 MILLIGRAM(S): at 16:10

## 2021-01-30 RX ADMIN — ENOXAPARIN SODIUM 70 MILLIGRAM(S): 100 INJECTION SUBCUTANEOUS at 06:16

## 2021-01-30 RX ADMIN — ENOXAPARIN SODIUM 70 MILLIGRAM(S): 100 INJECTION SUBCUTANEOUS at 17:45

## 2021-01-30 RX ADMIN — Medication 500 MILLIGRAM(S): at 17:45

## 2021-01-30 RX ADMIN — PANTOPRAZOLE SODIUM 40 MILLIGRAM(S): 20 TABLET, DELAYED RELEASE ORAL at 17:50

## 2021-01-30 NOTE — PROGRESS NOTE ADULT - SUBJECTIVE AND OBJECTIVE BOX
INTERVAL HPI:  80F Akan speaking  HTN and S/P lumbar Laminectomy,  c/o fever and SOB  for few days .    Tested COVID-19 (+) 6 days ago, started having shortness of breath few days ago with worsening dyspnea on exertion . Also reports increasing weakness.  In ED with temperature of 101, SPO2 90% on room air and CXT with bilateral infiltrates.  Non smoker.    OVERNIGHT EVENTS:  AWake    Vital Signs Last 24 Hrs  T(C): 36.7 (30 Jan 2021 16:43), Max: 36.7 (30 Jan 2021 16:43)  T(F): 98 (30 Jan 2021 16:43), Max: 98 (30 Jan 2021 16:43)  HR: 79 (30 Jan 2021 16:43) (71 - 79)  BP: 144/78 (30 Jan 2021 16:43) (121/66 - 144/82)  BP(mean): --  RR: 20 (30 Jan 2021 16:43) (18 - 20)  SpO2: 88% (30 Jan 2021 16:43) (88% - 97%)    PHYSICAL EXAM:  GEN:         Awake, responsive and comfortable.  HEENT:    Normal.    RESP:        mild distress  CVS:          Regular rate and rhythm.   ABD:         Soft, non-tender, non-distended;     MEDICATIONS  (STANDING):  amLODIPine   Tablet 5 milliGRAM(s) Oral daily  ascorbic acid 500 milliGRAM(s) Oral two times a day  dexAMETHasone  Injectable 6 milliGRAM(s) IV Push three times a day  enoxaparin Injectable 70 milliGRAM(s) SubCutaneous two times a day  influenza   Vaccine 0.5 milliLiter(s) IntraMuscular once  pantoprazole    Tablet 40 milliGRAM(s) Oral before breakfast  remdesivir  IVPB 100 milliGRAM(s) IV Intermittent every 24 hours  zinc sulfate 220 milliGRAM(s) Oral daily    MEDICATIONS  (PRN):  acetaminophen   Tablet .. 650 milliGRAM(s) Oral every 4 hours PRN Temp greater or equal to 38.5C (101.3F)  ALBUTerol    90 MICROgram(s) HFA Inhaler 2 Puff(s) Inhalation every 4 hours PRN Shortness of Breath and/or Wheezing  benzonatate 100 milliGRAM(s) Oral three times a day PRN Cough    LABS:                        13.5   6.86  )-----------( 313      ( 30 Jan 2021 08:27 )             40.2     01-30    138  |  106  |  17  ----------------------------<  168<H>  4.0   |  29  |  0.81    Ca    7.7<L>      30 Jan 2021 08:27    TPro  6.3  /  Alb  2.2<L>  /  TBili  0.4  /  DBili  .12  /  AST  29  /  ALT  30  /  AlkPhos  116  01-30    ASSESSMENT AND PLAN:  ·	SOB.  ·	Hypoxia.  ·	COVID pneumonia.  ·	HTN    SPO2 84-87% on 50% Venti mask, changed back to NRB mask, SPO2 now 98.  on full dose Lovenox as D Dimer was going up.  Continue Remdisivir and Dexamethasone.

## 2021-01-30 NOTE — PROGRESS NOTE ADULT - SUBJECTIVE AND OBJECTIVE BOX
Patient is a 80y old  Female who presents with a chief complaint of sob (29 Jan 2021 13:28)      INTERVAL HPI/OVERNIGHT EVENTS:  pt is stable on Venti mask  MEDICATIONS  (STANDING):  amLODIPine   Tablet 5 milliGRAM(s) Oral daily  ascorbic acid 500 milliGRAM(s) Oral two times a day  dexAMETHasone  Injectable 6 milliGRAM(s) IV Push three times a day  enoxaparin Injectable 70 milliGRAM(s) SubCutaneous two times a day  influenza   Vaccine 0.5 milliLiter(s) IntraMuscular once  remdesivir  IVPB 100 milliGRAM(s) IV Intermittent every 24 hours  zinc sulfate 220 milliGRAM(s) Oral daily    MEDICATIONS  (PRN):  acetaminophen   Tablet .. 650 milliGRAM(s) Oral every 4 hours PRN Temp greater or equal to 38.5C (101.3F)  ALBUTerol    90 MICROgram(s) HFA Inhaler 2 Puff(s) Inhalation every 4 hours PRN Shortness of Breath and/or Wheezing  benzonatate 100 milliGRAM(s) Oral three times a day PRN Cough      Allergies    No Known Allergies    Intolerances        REVIEW OF SYSTEMS:  CONSTITUTIONAL: No fever, weight loss, or fatigue  EYES: No eye pain, visual disturbances, or discharge  ENMT:  No difficulty hearing, tinnitus, vertigo; No sinus or throat pain  NECK: No pain or stiffness  BREASTS: No pain, masses, or nipple discharge  RESPIRATORY: No cough, wheezing, chills or hemoptysis; No shortness of breath  CARDIOVASCULAR: No chest pain, palpitations, dizziness, or leg swelling  GASTROINTESTINAL: No abdominal or epigastric pain. No nausea, vomiting, or hematemesis; No diarrhea or constipation. No melena or hematochezia.  GENITOURINARY: No dysuria, frequency, hematuria, or incontinence  NEUROLOGICAL: No headaches, memory loss, loss of strength, numbness, or tremors  SKIN: No itching, burning, rashes, or lesions   LYMPH NODES: No enlarged glands  ENDOCRINE: No heat or cold intolerance; No hair loss  MUSCULOSKELETAL: No joint pain or swelling; No muscle, back, or extremity pain  PSYCHIATRIC: No depression, anxiety, mood swings, or difficulty sleeping  HEME/LYMPH: No easy bruising, or bleeding gums  ALLERGY AND IMMUNOLOGIC: No hives or eczema    Vital Signs Last 24 Hrs  T(C): 36.4 (30 Jan 2021 10:23), Max: 36.7 (29 Jan 2021 16:21)  T(F): 97.5 (30 Jan 2021 10:23), Max: 98.1 (29 Jan 2021 16:21)  HR: 76 (30 Jan 2021 10:23) (71 - 77)  BP: 138/77 (30 Jan 2021 10:23) (121/66 - 144/82)  BP(mean): --  RR: 18 (30 Jan 2021 10:23) (18 - 18)  SpO2: 88% (30 Jan 2021 10:23) (88% - 97%)    PHYSICAL EXAM:  GENERAL: NAD, well-groomed, well-developed  HEAD:  Atraumatic, Normocephalic  EYES: EOMI, PERRLA, conjunctiva and sclera clear  ENMT: No tonsillar erythema, exudates, or enlargement; Moist mucous membranes, Good dentition, No lesions  NECK: Supple, No JVD, Normal thyroid  NERVOUS SYSTEM:  Alert & Oriented X3, Good concentration; Motor Strength 5/5 B/L upper and lower extremities; DTRs 2+ intact and symmetric  CHEST/LUNG: Clear to percussion bilaterally; No rales, rhonchi, wheezing, or rubs  HEART: Regular rate and rhythm; No murmurs, rubs, or gallops  ABDOMEN: Soft, Nontender, Nondistended; Bowel sounds present  EXTREMITIES:  2+ Peripheral Pulses, No clubbing, cyanosis, or edema  LYMPH: No lymphadenopathy noted  SKIN: No rashes or lesions    LABS:                        13.5   6.86  )-----------( 313      ( 30 Jan 2021 08:27 )             40.2     01-30    138  |  106  |  17  ----------------------------<  168<H>  4.0   |  29  |  0.81    Ca    7.7<L>      30 Jan 2021 08:27    TPro  6.3  /  Alb  2.2<L>  /  TBili  0.4  /  DBili  .12  /  AST  29  /  ALT  30  /  AlkPhos  116  01-30        CAPILLARY BLOOD GLUCOSE        CULTURES:    HEMOGLOBIN A1C:    CHOLESTEROL:        RADIOLOGY & ADDITIONAL TESTS:

## 2021-01-31 LAB
ALBUMIN SERPL ELPH-MCNC: 2.2 G/DL — LOW (ref 3.3–5)
ALBUMIN SERPL ELPH-MCNC: 2.3 G/DL — LOW (ref 3.3–5)
ALP SERPL-CCNC: 126 U/L — HIGH (ref 40–120)
ALP SERPL-CCNC: 133 U/L — HIGH (ref 40–120)
ALT FLD-CCNC: 29 U/L — SIGNIFICANT CHANGE UP (ref 12–78)
ALT FLD-CCNC: 30 U/L — SIGNIFICANT CHANGE UP (ref 12–78)
ANION GAP SERPL CALC-SCNC: 4 MMOL/L — LOW (ref 5–17)
APTT BLD: 32.4 SEC — SIGNIFICANT CHANGE UP (ref 27.5–35.5)
AST SERPL-CCNC: 24 U/L — SIGNIFICANT CHANGE UP (ref 15–37)
AST SERPL-CCNC: 29 U/L — SIGNIFICANT CHANGE UP (ref 15–37)
BILIRUB DIRECT SERPL-MCNC: 0.12 MG/DL — SIGNIFICANT CHANGE UP (ref 0.05–0.2)
BILIRUB INDIRECT FLD-MCNC: 0.2 MG/DL — SIGNIFICANT CHANGE UP (ref 0.2–1)
BILIRUB SERPL-MCNC: 0.3 MG/DL — SIGNIFICANT CHANGE UP (ref 0.2–1.2)
BILIRUB SERPL-MCNC: 0.3 MG/DL — SIGNIFICANT CHANGE UP (ref 0.2–1.2)
BUN SERPL-MCNC: 19 MG/DL — SIGNIFICANT CHANGE UP (ref 7–23)
CALCIUM SERPL-MCNC: 7.5 MG/DL — LOW (ref 8.5–10.1)
CHLORIDE SERPL-SCNC: 108 MMOL/L — SIGNIFICANT CHANGE UP (ref 96–108)
CK MB BLD-MCNC: 2.2 % — SIGNIFICANT CHANGE UP (ref 0–3.5)
CK MB CFR SERPL CALC: 1.5 NG/ML — SIGNIFICANT CHANGE UP (ref 0.5–3.6)
CK SERPL-CCNC: 68 U/L — SIGNIFICANT CHANGE UP (ref 26–192)
CO2 SERPL-SCNC: 28 MMOL/L — SIGNIFICANT CHANGE UP (ref 22–31)
CREAT SERPL-MCNC: 0.75 MG/DL — SIGNIFICANT CHANGE UP (ref 0.5–1.3)
CREAT SERPL-MCNC: 0.83 MG/DL — SIGNIFICANT CHANGE UP (ref 0.5–1.3)
D DIMER BLD IA.RAPID-MCNC: 698 NG/ML DDU — HIGH
GLUCOSE SERPL-MCNC: 234 MG/DL — HIGH (ref 70–99)
HCT VFR BLD CALC: 40.4 % — SIGNIFICANT CHANGE UP (ref 34.5–45)
HGB BLD-MCNC: 13.6 G/DL — SIGNIFICANT CHANGE UP (ref 11.5–15.5)
INR BLD: 1.34 RATIO — HIGH (ref 0.88–1.16)
MCHC RBC-ENTMCNC: 29.6 PG — SIGNIFICANT CHANGE UP (ref 27–34)
MCHC RBC-ENTMCNC: 33.7 GM/DL — SIGNIFICANT CHANGE UP (ref 32–36)
MCV RBC AUTO: 88 FL — SIGNIFICANT CHANGE UP (ref 80–100)
NRBC # BLD: 0 /100 WBCS — SIGNIFICANT CHANGE UP (ref 0–0)
PLATELET # BLD AUTO: 310 K/UL — SIGNIFICANT CHANGE UP (ref 150–400)
POTASSIUM SERPL-MCNC: 4.2 MMOL/L — SIGNIFICANT CHANGE UP (ref 3.5–5.3)
POTASSIUM SERPL-SCNC: 4.2 MMOL/L — SIGNIFICANT CHANGE UP (ref 3.5–5.3)
PROT SERPL-MCNC: 6 GM/DL — SIGNIFICANT CHANGE UP (ref 6–8.3)
PROT SERPL-MCNC: 6.3 GM/DL — SIGNIFICANT CHANGE UP (ref 6–8.3)
PROTHROM AB SERPL-ACNC: 15.3 SEC — HIGH (ref 10.6–13.6)
RBC # BLD: 4.59 M/UL — SIGNIFICANT CHANGE UP (ref 3.8–5.2)
RBC # FLD: 14 % — SIGNIFICANT CHANGE UP (ref 10.3–14.5)
SODIUM SERPL-SCNC: 140 MMOL/L — SIGNIFICANT CHANGE UP (ref 135–145)
TROPONIN I SERPL-MCNC: <.015 NG/ML — SIGNIFICANT CHANGE UP (ref 0.01–0.04)
WBC # BLD: 6.15 K/UL — SIGNIFICANT CHANGE UP (ref 3.8–10.5)
WBC # FLD AUTO: 6.15 K/UL — SIGNIFICANT CHANGE UP (ref 3.8–10.5)

## 2021-01-31 PROCEDURE — 71045 X-RAY EXAM CHEST 1 VIEW: CPT | Mod: 26

## 2021-01-31 PROCEDURE — 93010 ELECTROCARDIOGRAM REPORT: CPT

## 2021-01-31 PROCEDURE — 99231 SBSQ HOSP IP/OBS SF/LOW 25: CPT

## 2021-01-31 RX ORDER — CALCIUM CARBONATE 500(1250)
1 TABLET ORAL THREE TIMES A DAY
Refills: 0 | Status: DISCONTINUED | OUTPATIENT
Start: 2021-01-31 | End: 2021-02-09

## 2021-01-31 RX ADMIN — ENOXAPARIN SODIUM 70 MILLIGRAM(S): 100 INJECTION SUBCUTANEOUS at 18:02

## 2021-01-31 RX ADMIN — Medication 6 MILLIGRAM(S): at 22:49

## 2021-01-31 RX ADMIN — Medication 6 MILLIGRAM(S): at 12:33

## 2021-01-31 RX ADMIN — Medication 1 TABLET(S): at 14:09

## 2021-01-31 RX ADMIN — PANTOPRAZOLE SODIUM 40 MILLIGRAM(S): 20 TABLET, DELAYED RELEASE ORAL at 06:04

## 2021-01-31 RX ADMIN — AMLODIPINE BESYLATE 5 MILLIGRAM(S): 2.5 TABLET ORAL at 06:02

## 2021-01-31 RX ADMIN — ZINC SULFATE TAB 220 MG (50 MG ZINC EQUIVALENT) 220 MILLIGRAM(S): 220 (50 ZN) TAB at 12:34

## 2021-01-31 RX ADMIN — REMDESIVIR 500 MILLIGRAM(S): 5 INJECTION INTRAVENOUS at 22:49

## 2021-01-31 RX ADMIN — Medication 500 MILLIGRAM(S): at 18:02

## 2021-01-31 RX ADMIN — Medication 500 MILLIGRAM(S): at 06:02

## 2021-01-31 RX ADMIN — Medication 6 MILLIGRAM(S): at 06:03

## 2021-01-31 RX ADMIN — ENOXAPARIN SODIUM 70 MILLIGRAM(S): 100 INJECTION SUBCUTANEOUS at 06:02

## 2021-01-31 NOTE — CHART NOTE - NSCHARTNOTEFT_GEN_A_CORE
Medicine Hospitalist PA    Was notified by RN pt c/o chest pain. Pt was seen and examined at bedside, Pt  speaking but able to communicate has pain in chest that just started.     Vital Signs Last 24 Hrs  T(C): 36.6 (31 Jan 2021 11:03), Max: 36.7 (30 Jan 2021 16:43)  T(F): 97.8 (31 Jan 2021 11:03), Max: 98.1 (30 Jan 2021 23:33)  HR: 74 (31 Jan 2021 13:30) (64 - 82)  BP: 142/74 (31 Jan 2021 13:30) (119/68 - 144/78)  RR: 18 (31 Jan 2021 13:30) (18 - 20)  SpO2: 98% on NRB (31 Jan 2021 13:30) (88% - 99%)    General: alert awake and oriented x 3  CV: S1 S2, no TTP on chest  Resp: good air entry b/l  Abd: Soft, NT/ND, BS+  Extremities: no LE edema    A/P: 80 YOF Akan speaking PMHX HTN admitted with COVID PNA c/o chest pain likely 2/2 pna.  - Stat EKG: unchanged from previous  - Stat Cardiac Enzymes  - Tums for dyspepsia  - Continue full dose Lovenox bid  - STAT Labs  - STAT CXR  - Advised to ambulate and breathing exercises  - Continue to monitor Medicine Hospitalist PA    Was notified by RN pt c/o chest pain. Pt was seen and examined at bedside, Pt  speaking but able to communicate has pain in chest that just started.     Vital Signs Last 24 Hrs  T(C): 36.6 (31 Jan 2021 11:03), Max: 36.7 (30 Jan 2021 16:43)  T(F): 97.8 (31 Jan 2021 11:03), Max: 98.1 (30 Jan 2021 23:33)  HR: 74 (31 Jan 2021 13:30) (64 - 82)  BP: 142/74 (31 Jan 2021 13:30) (119/68 - 144/78)  RR: 18 (31 Jan 2021 13:30) (18 - 20)  SpO2: 98% on NRB (31 Jan 2021 13:30) (88% - 99%)    General: alert awake and oriented x 3  CV: S1 S2, no TTP on chest  Resp: good air entry b/l  Abd: Soft, NT/ND, BS+  Extremities: no LE edema    A/P: 80 YOF Akan speaking PMHX HTN admitted with COVID PNA c/o chest pain likely 2/2 pna.  - Stat EKG: unchanged from previous  - Stat Cardiac Enzymes  - Tums for dyspepsia  - Continue full dose Lovenox bid  - STAT Labs  - STAT CXR  - Advised to ambulate and breathing exercises  - Continue to monitor    Total time spent: 15 minutes

## 2021-01-31 NOTE — PROGRESS NOTE ADULT - SUBJECTIVE AND OBJECTIVE BOX
Patient is a 80y old  Female who presents with a chief complaint of sob (30 Jan 2021 18:11)      INTERVAL HPI/OVERNIGHT EVENTS:  pt is stable on non rebreather  MEDICATIONS  (STANDING):  amLODIPine   Tablet 5 milliGRAM(s) Oral daily  ascorbic acid 500 milliGRAM(s) Oral two times a day  dexAMETHasone  Injectable 6 milliGRAM(s) IV Push three times a day  enoxaparin Injectable 70 milliGRAM(s) SubCutaneous two times a day  influenza   Vaccine 0.5 milliLiter(s) IntraMuscular once  pantoprazole    Tablet 40 milliGRAM(s) Oral before breakfast  remdesivir  IVPB 100 milliGRAM(s) IV Intermittent every 24 hours  zinc sulfate 220 milliGRAM(s) Oral daily    MEDICATIONS  (PRN):  acetaminophen   Tablet .. 650 milliGRAM(s) Oral every 4 hours PRN Temp greater or equal to 38.5C (101.3F)  ALBUTerol    90 MICROgram(s) HFA Inhaler 2 Puff(s) Inhalation every 4 hours PRN Shortness of Breath and/or Wheezing  benzonatate 100 milliGRAM(s) Oral three times a day PRN Cough      Allergies    No Known Allergies    Intolerances        REVIEW OF SYSTEMS:  CONSTITUTIONAL: No fever, weight loss, or fatigue  EYES: No eye pain, visual disturbances, or discharge  ENMT:  No difficulty hearing, tinnitus, vertigo; No sinus or throat pain  NECK: No pain or stiffness  BREASTS: No pain, masses, or nipple discharge  RESPIRATORY: No cough, wheezing, chills or hemoptysis; No shortness of breath  CARDIOVASCULAR: No chest pain, palpitations, dizziness, or leg swelling  GASTROINTESTINAL: No abdominal or epigastric pain. No nausea, vomiting, or hematemesis; No diarrhea or constipation. No melena or hematochezia.  GENITOURINARY: No dysuria, frequency, hematuria, or incontinence  NEUROLOGICAL: No headaches, memory loss, loss of strength, numbness, or tremors  SKIN: No itching, burning, rashes, or lesions   LYMPH NODES: No enlarged glands  ENDOCRINE: No heat or cold intolerance; No hair loss  MUSCULOSKELETAL: No joint pain or swelling; No muscle, back, or extremity pain  PSYCHIATRIC: No depression, anxiety, mood swings, or difficulty sleeping  HEME/LYMPH: No easy bruising, or bleeding gums  ALLERGY AND IMMUNOLOGIC: No hives or eczema    Vital Signs Last 24 Hrs  T(C): 36.6 (31 Jan 2021 11:03), Max: 36.7 (30 Jan 2021 16:43)  T(F): 97.8 (31 Jan 2021 11:03), Max: 98.1 (30 Jan 2021 23:33)  HR: 82 (31 Jan 2021 11:03) (64 - 82)  BP: 128/81 (31 Jan 2021 11:03) (119/68 - 144/78)  BP(mean): --  RR: 19 (31 Jan 2021 11:03) (19 - 20)  SpO2: 99% (31 Jan 2021 11:03) (88% - 99%)    PHYSICAL EXAM:  GENERAL: NAD, well-groomed, well-developed  HEAD:  Atraumatic, Normocephalic  EYES: EOMI, PERRLA, conjunctiva and sclera clear  ENMT: No tonsillar erythema, exudates, or enlargement; Moist mucous membranes, Good dentition, No lesions  NECK: Supple, No JVD, Normal thyroid  NERVOUS SYSTEM:  Alert & Oriented X3, Good concentration; Motor Strength 5/5 B/L upper and lower extremities; DTRs 2+ intact and symmetric  CHEST/LUNG: Clear to percussion bilaterally; No rales, rhonchi, wheezing, or rubs  HEART: Regular rate and rhythm; No murmurs, rubs, or gallops  ABDOMEN: Soft, Nontender, Nondistended; Bowel sounds present  EXTREMITIES:  2+ Peripheral Pulses, No clubbing, cyanosis, or edema  LYMPH: No lymphadenopathy noted  SKIN: No rashes or lesions    LABS:                        13.5   6.86  )-----------( 313      ( 30 Jan 2021 08:27 )             40.2     01-31    x   |  x   |  x   ----------------------------<  x   x    |  x   |  0.75    Ca    7.7<L>      30 Jan 2021 08:27    TPro  6.0  /  Alb  2.2<L>  /  TBili  0.3  /  DBili  .12  /  AST  24  /  ALT  30  /  AlkPhos  126<H>  01-31        CAPILLARY BLOOD GLUCOSE        CULTURES:    HEMOGLOBIN A1C:    CHOLESTEROL:        RADIOLOGY & ADDITIONAL TESTS:

## 2021-01-31 NOTE — PROGRESS NOTE ADULT - SUBJECTIVE AND OBJECTIVE BOX
HPI:  80F Akan speaking  PMHX HTN c/o fever for few days and get  tested COVID-19 (+) 6 days ago but the past few days she is having shortness of breath as well  as worsening dyspnea on exertion now presenting with hypoxia 90% on RA. Pt stated she is feeling tired and weak unable to ambulate. (24 Jan 2021 22:09)      Allergies    No Known Allergies    Intolerances        MEDICATIONS  (STANDING):  amLODIPine   Tablet 5 milliGRAM(s) Oral daily  ascorbic acid 500 milliGRAM(s) Oral two times a day  dexAMETHasone  Injectable 6 milliGRAM(s) IV Push three times a day  enoxaparin Injectable 70 milliGRAM(s) SubCutaneous two times a day  influenza   Vaccine 0.5 milliLiter(s) IntraMuscular once  pantoprazole    Tablet 40 milliGRAM(s) Oral before breakfast  remdesivir  IVPB 100 milliGRAM(s) IV Intermittent every 24 hours  zinc sulfate 220 milliGRAM(s) Oral daily    MEDICATIONS  (PRN):  acetaminophen   Tablet .. 650 milliGRAM(s) Oral every 4 hours PRN Temp greater or equal to 38.5C (101.3F)  ALBUTerol    90 MICROgram(s) HFA Inhaler 2 Puff(s) Inhalation every 4 hours PRN Shortness of Breath and/or Wheezing  benzonatate 100 milliGRAM(s) Oral three times a day PRN Cough      REVIEW OF SYSTEMS:    CONSTITUTIONAL: No fever, chills, weight loss, or fatigue  HEENT: No sore throat, runny nose, ear ache  RESPIRATORY: No cough, wheezing, No shortness of breath  CARDIOVASCULAR: No chest pain, palpitations, dizziness  GASTROINTESTINAL: No abdominal pain. No nausea, vomiting, diarrhea  GENITOURINARY: No dysuria, increase frequency, hematuria, or incontinence  NEUROLOGICAL: No headaches, memory loss, loss of strength, numbness, or tremors, no weakness  EXTREMITY: No pedal edema BLE  SKIN: No itching, burning, rashes, or lesions     VITAL SIGNS:  T(C): 36.6 (01-31-21 @ 11:03), Max: 36.7 (01-30-21 @ 16:43)  T(F): 97.8 (01-31-21 @ 11:03), Max: 98.1 (01-30-21 @ 23:33)  HR: 82 (01-31-21 @ 11:03) (64 - 82)  BP: 128/81 (01-31-21 @ 11:03) (119/68 - 144/78)  RR: 19 (01-31-21 @ 11:03) (19 - 20)  SpO2: 99% (01-31-21 @ 11:03) (88% - 99%)  Wt(kg): --    PHYSICAL EXAM:    GENERAL: not in any distress  HEENT: Neck is supple, normocephalic, atraumatic   CHEST/LUNG: Clear to auscultation bilaterally; No rales, rhonchi, wheezing  HEART: Regular rate and rhythm; No murmurs, rubs, or gallops  ABDOMEN: Soft, Nontender, Nondistended; Bowel sounds present, no rebound   EXTREMITIES:  2+ Peripheral Pulses, No clubbing, cyanosis, or edema  GENITOURINARY:   SKIN: No rashes or lesions  BACK: no pressor sore   NERVOUS SYSTEM:  Alert & Oriented X3, Good concentration  PSYCH: normal affect     LABS:                         13.5   6.86  )-----------( 313      ( 30 Jan 2021 08:27 )             40.2     01-31    x   |  x   |  x   ----------------------------<  x   x    |  x   |  0.75    Ca    7.7<L>      30 Jan 2021 08:27    TPro  6.0  /  Alb  2.2<L>  /  TBili  0.3  /  DBili  .12  /  AST  24  /  ALT  30  /  AlkPhos  126<H>  01-31    LIVER FUNCTIONS - ( 31 Jan 2021 08:11 )  Alb: 2.2 g/dL / Pro: 6.0 gm/dL / ALK PHOS: 126 U/L / ALT: 30 U/L / AST: 24 U/L / GGT: x                                   Culture Results:   No Growth Final (01-25 @ 08:54)  Culture Results:   No Growth Final (01-25 @ 08:54)                Radiology:       HPI:  80F Akan speaking  PMHX HTN c/o fever for few days and get  tested COVID-19 (+) 6 days ago but the past few days she is having shortness of breath as well  as worsening dyspnea on exertion now presenting with hypoxia 90% on RA. Pt stated she is feeling tired and weak unable to ambulate. (24 Jan 2021 22:09)  all events noted since last seen   discussed with patient and daughter on the phone by bedside     Allergies    No Known Allergies    Intolerances        MEDICATIONS  (STANDING):  amLODIPine   Tablet 5 milliGRAM(s) Oral daily  ascorbic acid 500 milliGRAM(s) Oral two times a day  dexAMETHasone  Injectable 6 milliGRAM(s) IV Push three times a day  enoxaparin Injectable 70 milliGRAM(s) SubCutaneous two times a day  influenza   Vaccine 0.5 milliLiter(s) IntraMuscular once  pantoprazole    Tablet 40 milliGRAM(s) Oral before breakfast  remdesivir  IVPB 100 milliGRAM(s) IV Intermittent every 24 hours  zinc sulfate 220 milliGRAM(s) Oral daily    MEDICATIONS  (PRN):  acetaminophen   Tablet .. 650 milliGRAM(s) Oral every 4 hours PRN Temp greater or equal to 38.5C (101.3F)  ALBUTerol    90 MICROgram(s) HFA Inhaler 2 Puff(s) Inhalation every 4 hours PRN Shortness of Breath and/or Wheezing  benzonatate 100 milliGRAM(s) Oral three times a day PRN Cough      REVIEW OF SYSTEMS:    plus cough  short of breath   no nausea vomiting diarrhea  no appetite though    VITAL SIGNS:  T(C): 36.6 (01-31-21 @ 11:03), Max: 36.7 (01-30-21 @ 16:43)  T(F): 97.8 (01-31-21 @ 11:03), Max: 98.1 (01-30-21 @ 23:33)  HR: 82 (01-31-21 @ 11:03) (64 - 82)  BP: 128/81 (01-31-21 @ 11:03) (119/68 - 144/78)  RR: 19 (01-31-21 @ 11:03) (19 - 20)  SpO2: 99% (01-31-21 @ 11:03) (88% - 99%)  Wt(kg): --    PHYSICAL EXAM:    GENERAL: mod resp distress on 100 % nrb   HEENT: Neck is supple, normocephalic, atraumatic   CHEST/LUNG: Clear to auscultation bilaterally; No rales, rhonchi, wheezing  HEART: Regular rate and rhythm; No murmurs, rubs, or gallops  ABDOMEN: Soft, Nontender, Nondistended; Bowel sounds present, no rebound   EXTREMITIES:  2+ Peripheral Pulses, No clubbing, cyanosis, or edema  SKIN: No rashes or lesions  BACK: no pressor sore   NERVOUS SYSTEM:  Alert & Oriented X3, Good concentration  PSYCH: normal affect     LABS:                         13.5   6.86  )-----------( 313      ( 30 Jan 2021 08:27 )             40.2     01-31    x   |  x   |  x   ----------------------------<  x   x    |  x   |  0.75    Ca    7.7<L>      30 Jan 2021 08:27    TPro  6.0  /  Alb  2.2<L>  /  TBili  0.3  /  DBili  .12  /  AST  24  /  ALT  30  /  AlkPhos  126<H>  01-31    LIVER FUNCTIONS - ( 31 Jan 2021 08:11 )  Alb: 2.2 g/dL / Pro: 6.0 gm/dL / ALK PHOS: 126 U/L / ALT: 30 U/L / AST: 24 U/L / GGT: x                                   Culture Results:   No Growth Final (01-25 @ 08:54)  Culture Results:   No Growth Final (01-25 @ 08:54)                Radiology:    r< from: Xray Chest 1 View- PORTABLE-Urgent (Xray Chest 1 View- PORTABLE-Urgent .) (01.31.21 @ 14:56) >  IMPRESSION: There is multifocal airspace disease compatible with multifocal bilateral pneumonia that is relatively unchanged compared to the prior study. The heart is normal in size.            ANNIA MERCADO MD; Attending Radiologist  This document has been electronically signed. Jan 31 2021  3:25PM    < end of copied text >

## 2021-01-31 NOTE — DIETITIAN INITIAL EVALUATION ADULT. - OTHER CALCULATIONS
Ht (cm):  162.56     Wt (kg):  76.9 (1/31)     BMI:   29.1      IBW: 54.5 kg   %IBW:  141%   UBW:  unknown   %UBW: unknown

## 2021-01-31 NOTE — PROGRESS NOTE ADULT - SUBJECTIVE AND OBJECTIVE BOX
INTERVAL HPI:  80F Akan speaking  HTN and S/P lumbar Laminectomy,  c/o fever and SOB  for few days .    Tested COVID-19 (+) 6 days ago, started having shortness of breath few days ago with worsening dyspnea on exertion . Also reports increasing weakness.  In ED with temperature of 101, SPO2 90% on room air and CXT with bilateral infiltrates.  Non smoker.    OVERNIGHT EVENTS:   On NRB mask    Vital Signs Last 24 Hrs  T(C): 36.8 (31 Jan 2021 16:01), Max: 36.8 (31 Jan 2021 16:01)  T(F): 98.3 (31 Jan 2021 16:01), Max: 98.3 (31 Jan 2021 16:01)  HR: 66 (31 Jan 2021 16:01) (64 - 82)  BP: 137/67 (31 Jan 2021 16:01) (119/68 - 142/74)  BP(mean): --  RR: 18 (31 Jan 2021 16:01) (18 - 19)  SpO2: 99% (31 Jan 2021 16:01) (97% - 99%)    PHYSICAL EXAM:  GEN:         Awake, responsive and comfortable.  HEENT:    Normal.    RESP:        no distress  CVS:          Regular rate and rhythm.   ABD:         Soft, non-tender, non-distended;     MEDICATIONS  (STANDING):  amLODIPine   Tablet 5 milliGRAM(s) Oral daily  ascorbic acid 500 milliGRAM(s) Oral two times a day  dexAMETHasone  Injectable 6 milliGRAM(s) IV Push three times a day  enoxaparin Injectable 70 milliGRAM(s) SubCutaneous two times a day  influenza   Vaccine 0.5 milliLiter(s) IntraMuscular once  pantoprazole    Tablet 40 milliGRAM(s) Oral before breakfast  remdesivir  IVPB 100 milliGRAM(s) IV Intermittent every 24 hours  zinc sulfate 220 milliGRAM(s) Oral daily    MEDICATIONS  (PRN):  acetaminophen   Tablet .. 650 milliGRAM(s) Oral every 4 hours PRN Temp greater or equal to 38.5C (101.3F)  ALBUTerol    90 MICROgram(s) HFA Inhaler 2 Puff(s) Inhalation every 4 hours PRN Shortness of Breath and/or Wheezing  benzonatate 100 milliGRAM(s) Oral three times a day PRN Cough  calcium carbonate    500 mG (Tums) Chewable 1 Tablet(s) Chew three times a day PRN Heartburn    LABS:                        13.6   6.15  )-----------( 310      ( 31 Jan 2021 14:39 )             40.4     01-31    140  |  108  |  19  ----------------------------<  234<H>  4.2   |  28  |  0.83    Ca    7.5<L>      31 Jan 2021 14:39    TPro  6.3  /  Alb  2.3<L>  /  TBili  0.3  /  DBili  x   /  AST  29  /  ALT  29  /  AlkPhos  133<H>  01-31    PT/INR - ( 31 Jan 2021 14:39 )   PT: 15.3 sec;   INR: 1.34 ratio      PTT - ( 31 Jan 2021 14:39 )  PTT:32.4 sec  ASSESSMENT AND PLAN:  ·	SOB.  ·	Hypoxia.  ·	COVID pneumonia.  ·	HTN    SPO2 in high 90s on NRB masK.  Try on 50% Venti mask tomorrow.

## 2021-01-31 NOTE — DIETITIAN INITIAL EVALUATION ADULT. - OTHER INFO
Unable to speak c pt via phone due to language barrier; attempt to speak c family via phone was unsuccessful.  Per staff pt eating well; no nutrition-related issues. No reports of any N/V/C/D or chew/swallowing difficulty. Pt remains on O2 support.

## 2021-02-01 LAB
ALBUMIN SERPL ELPH-MCNC: 2.3 G/DL — LOW (ref 3.3–5)
ALP SERPL-CCNC: 129 U/L — HIGH (ref 40–120)
ALT FLD-CCNC: 28 U/L — SIGNIFICANT CHANGE UP (ref 12–78)
AST SERPL-CCNC: 24 U/L — SIGNIFICANT CHANGE UP (ref 15–37)
BILIRUB DIRECT SERPL-MCNC: 0.11 MG/DL — SIGNIFICANT CHANGE UP (ref 0.05–0.2)
BILIRUB INDIRECT FLD-MCNC: 0.3 MG/DL — SIGNIFICANT CHANGE UP (ref 0.2–1)
BILIRUB SERPL-MCNC: 0.4 MG/DL — SIGNIFICANT CHANGE UP (ref 0.2–1.2)
CREAT SERPL-MCNC: 0.89 MG/DL — SIGNIFICANT CHANGE UP (ref 0.5–1.3)
PROT SERPL-MCNC: 6.5 GM/DL — SIGNIFICANT CHANGE UP (ref 6–8.3)

## 2021-02-01 RX ADMIN — Medication 500 MILLIGRAM(S): at 05:40

## 2021-02-01 RX ADMIN — Medication 6 MILLIGRAM(S): at 12:15

## 2021-02-01 RX ADMIN — ZINC SULFATE TAB 220 MG (50 MG ZINC EQUIVALENT) 220 MILLIGRAM(S): 220 (50 ZN) TAB at 12:15

## 2021-02-01 RX ADMIN — Medication 6 MILLIGRAM(S): at 22:46

## 2021-02-01 RX ADMIN — Medication 6 MILLIGRAM(S): at 05:40

## 2021-02-01 RX ADMIN — REMDESIVIR 500 MILLIGRAM(S): 5 INJECTION INTRAVENOUS at 22:47

## 2021-02-01 RX ADMIN — AMLODIPINE BESYLATE 5 MILLIGRAM(S): 2.5 TABLET ORAL at 05:40

## 2021-02-01 RX ADMIN — ENOXAPARIN SODIUM 70 MILLIGRAM(S): 100 INJECTION SUBCUTANEOUS at 16:57

## 2021-02-01 RX ADMIN — Medication 500 MILLIGRAM(S): at 16:57

## 2021-02-01 RX ADMIN — PANTOPRAZOLE SODIUM 40 MILLIGRAM(S): 20 TABLET, DELAYED RELEASE ORAL at 05:41

## 2021-02-01 RX ADMIN — ENOXAPARIN SODIUM 70 MILLIGRAM(S): 100 INJECTION SUBCUTANEOUS at 05:41

## 2021-02-01 NOTE — PROGRESS NOTE ADULT - SUBJECTIVE AND OBJECTIVE BOX
Patient is a 80y old  Female who presents with a chief complaint of sob (31 Jan 2021 19:35)      INTERVAL HPI/OVERNIGHT EVENTS:  pt still on Venti mask  MEDICATIONS  (STANDING):  amLODIPine   Tablet 5 milliGRAM(s) Oral daily  ascorbic acid 500 milliGRAM(s) Oral two times a day  dexAMETHasone  Injectable 6 milliGRAM(s) IV Push three times a day  enoxaparin Injectable 70 milliGRAM(s) SubCutaneous two times a day  influenza   Vaccine 0.5 milliLiter(s) IntraMuscular once  pantoprazole    Tablet 40 milliGRAM(s) Oral before breakfast  remdesivir  IVPB 100 milliGRAM(s) IV Intermittent every 24 hours  zinc sulfate 220 milliGRAM(s) Oral daily    MEDICATIONS  (PRN):  acetaminophen   Tablet .. 650 milliGRAM(s) Oral every 4 hours PRN Temp greater or equal to 38.5C (101.3F)  ALBUTerol    90 MICROgram(s) HFA Inhaler 2 Puff(s) Inhalation every 4 hours PRN Shortness of Breath and/or Wheezing  benzonatate 100 milliGRAM(s) Oral three times a day PRN Cough  calcium carbonate    500 mG (Tums) Chewable 1 Tablet(s) Chew three times a day PRN Heartburn      Allergies    No Known Allergies    Intolerances        REVIEW OF SYSTEMS:  CONSTITUTIONAL: No fever, weight loss, or fatigue  EYES: No eye pain, visual disturbances, or discharge  ENMT:  No difficulty hearing, tinnitus, vertigo; No sinus or throat pain  NECK: No pain or stiffness  BREASTS: No pain, masses, or nipple discharge  RESPIRATORY: No cough, wheezing, chills or hemoptysis; No shortness of breath  CARDIOVASCULAR: No chest pain, palpitations, dizziness, or leg swelling  GASTROINTESTINAL: No abdominal or epigastric pain. No nausea, vomiting, or hematemesis; No diarrhea or constipation. No melena or hematochezia.  GENITOURINARY: No dysuria, frequency, hematuria, or incontinence  NEUROLOGICAL: No headaches, memory loss, loss of strength, numbness, or tremors  SKIN: No itching, burning, rashes, or lesions   LYMPH NODES: No enlarged glands  ENDOCRINE: No heat or cold intolerance; No hair loss  MUSCULOSKELETAL: No joint pain or swelling; No muscle, back, or extremity pain  PSYCHIATRIC: No depression, anxiety, mood swings, or difficulty sleeping  HEME/LYMPH: No easy bruising, or bleeding gums  ALLERGY AND IMMUNOLOGIC: No hives or eczema    Vital Signs Last 24 Hrs  T(C): 36.7 (01 Feb 2021 10:47), Max: 36.8 (31 Jan 2021 16:01)  T(F): 98 (01 Feb 2021 10:47), Max: 98.3 (31 Jan 2021 16:01)  HR: 73 (01 Feb 2021 10:47) (66 - 77)  BP: 130/79 (01 Feb 2021 10:47) (119/67 - 137/67)  BP(mean): --  RR: 18 (01 Feb 2021 10:47) (18 - 18)  SpO2: 98% (01 Feb 2021 10:47) (98% - 100%)    PHYSICAL EXAM:  GENERAL: NAD, well-groomed, well-developed  HEAD:  Atraumatic, Normocephalic  EYES: EOMI, PERRLA, conjunctiva and sclera clear  ENMT: No tonsillar erythema, exudates, or enlargement; Moist mucous membranes, Good dentition, No lesions  NECK: Supple, No JVD, Normal thyroid  NERVOUS SYSTEM:  Alert & Oriented X3, Good concentration; Motor Strength 5/5 B/L upper and lower extremities; DTRs 2+ intact and symmetric  CHEST/LUNG: Clear to percussion bilaterally; No rales, rhonchi, wheezing, or rubs  HEART: Regular rate and rhythm; No murmurs, rubs, or gallops  ABDOMEN: Soft, Nontender, Nondistended; Bowel sounds present  EXTREMITIES:  2+ Peripheral Pulses, No clubbing, cyanosis, or edema  LYMPH: No lymphadenopathy noted  SKIN: No rashes or lesions    LABS:                        13.6   6.15  )-----------( 310      ( 31 Jan 2021 14:39 )             40.4     02-01    x   |  x   |  x   ----------------------------<  x   x    |  x   |  0.89    Ca    7.5<L>      31 Jan 2021 14:39    TPro  6.5  /  Alb  2.3<L>  /  TBili  0.4  /  DBili  .11  /  AST  24  /  ALT  28  /  AlkPhos  129<H>  02-01    PT/INR - ( 31 Jan 2021 14:39 )   PT: 15.3 sec;   INR: 1.34 ratio         PTT - ( 31 Jan 2021 14:39 )  PTT:32.4 sec    CAPILLARY BLOOD GLUCOSE        CULTURES:    HEMOGLOBIN A1C:    CHOLESTEROL:        RADIOLOGY & ADDITIONAL TESTS:

## 2021-02-02 LAB
ALBUMIN SERPL ELPH-MCNC: 2.2 G/DL — LOW (ref 3.3–5)
ALP SERPL-CCNC: 123 U/L — HIGH (ref 40–120)
ALT FLD-CCNC: 25 U/L — SIGNIFICANT CHANGE UP (ref 12–78)
AST SERPL-CCNC: 19 U/L — SIGNIFICANT CHANGE UP (ref 15–37)
BILIRUB DIRECT SERPL-MCNC: 0.09 MG/DL — SIGNIFICANT CHANGE UP (ref 0.05–0.2)
BILIRUB INDIRECT FLD-MCNC: 0.2 MG/DL — SIGNIFICANT CHANGE UP (ref 0.2–1)
BILIRUB SERPL-MCNC: 0.3 MG/DL — SIGNIFICANT CHANGE UP (ref 0.2–1.2)
CREAT SERPL-MCNC: 0.84 MG/DL — SIGNIFICANT CHANGE UP (ref 0.5–1.3)
PROT SERPL-MCNC: 6 GM/DL — SIGNIFICANT CHANGE UP (ref 6–8.3)

## 2021-02-02 RX ORDER — DEXAMETHASONE 0.5 MG/5ML
6 ELIXIR ORAL ONCE
Refills: 0 | Status: COMPLETED | OUTPATIENT
Start: 2021-02-03 | End: 2021-02-03

## 2021-02-02 RX ADMIN — Medication 500 MILLIGRAM(S): at 06:34

## 2021-02-02 RX ADMIN — AMLODIPINE BESYLATE 5 MILLIGRAM(S): 2.5 TABLET ORAL at 06:34

## 2021-02-02 RX ADMIN — Medication 500 MILLIGRAM(S): at 17:20

## 2021-02-02 RX ADMIN — REMDESIVIR 500 MILLIGRAM(S): 5 INJECTION INTRAVENOUS at 20:26

## 2021-02-02 RX ADMIN — PANTOPRAZOLE SODIUM 40 MILLIGRAM(S): 20 TABLET, DELAYED RELEASE ORAL at 06:34

## 2021-02-02 RX ADMIN — Medication 6 MILLIGRAM(S): at 06:34

## 2021-02-02 RX ADMIN — ENOXAPARIN SODIUM 70 MILLIGRAM(S): 100 INJECTION SUBCUTANEOUS at 17:20

## 2021-02-02 RX ADMIN — ENOXAPARIN SODIUM 70 MILLIGRAM(S): 100 INJECTION SUBCUTANEOUS at 06:33

## 2021-02-02 RX ADMIN — ZINC SULFATE TAB 220 MG (50 MG ZINC EQUIVALENT) 220 MILLIGRAM(S): 220 (50 ZN) TAB at 11:56

## 2021-02-02 NOTE — PHARMACOTHERAPY INTERVENTION NOTE - COMMENTS
Recommended adjusting duration of dexamethasone for COVID-19 as patient is on day 10 of dexamethasone.

## 2021-02-02 NOTE — PROGRESS NOTE ADULT - SUBJECTIVE AND OBJECTIVE BOX
Patient is a 80y old  Female who presents with a chief complaint of sob (01 Feb 2021 13:55)      INTERVAL HPI/OVERNIGHT EVENTS:  pt is feeling better  MEDICATIONS  (STANDING):  amLODIPine   Tablet 5 milliGRAM(s) Oral daily  ascorbic acid 500 milliGRAM(s) Oral two times a day  enoxaparin Injectable 70 milliGRAM(s) SubCutaneous two times a day  influenza   Vaccine 0.5 milliLiter(s) IntraMuscular once  pantoprazole    Tablet 40 milliGRAM(s) Oral before breakfast  remdesivir  IVPB 100 milliGRAM(s) IV Intermittent every 24 hours  zinc sulfate 220 milliGRAM(s) Oral daily    MEDICATIONS  (PRN):  acetaminophen   Tablet .. 650 milliGRAM(s) Oral every 4 hours PRN Temp greater or equal to 38.5C (101.3F)  ALBUTerol    90 MICROgram(s) HFA Inhaler 2 Puff(s) Inhalation every 4 hours PRN Shortness of Breath and/or Wheezing  benzonatate 100 milliGRAM(s) Oral three times a day PRN Cough  calcium carbonate    500 mG (Tums) Chewable 1 Tablet(s) Chew three times a day PRN Heartburn      Allergies    No Known Allergies    Intolerances        REVIEW OF SYSTEMS:  CONSTITUTIONAL: No fever, weight loss, or fatigue  EYES: No eye pain, visual disturbances, or discharge  ENMT:  No difficulty hearing, tinnitus, vertigo; No sinus or throat pain  NECK: No pain or stiffness  BREASTS: No pain, masses, or nipple discharge  RESPIRATORY: No cough, wheezing, chills or hemoptysis; No shortness of breath  CARDIOVASCULAR: No chest pain, palpitations, dizziness, or leg swelling  GASTROINTESTINAL: No abdominal or epigastric pain. No nausea, vomiting, or hematemesis; No diarrhea or constipation. No melena or hematochezia.  GENITOURINARY: No dysuria, frequency, hematuria, or incontinence  NEUROLOGICAL: No headaches, memory loss, loss of strength, numbness, or tremors  SKIN: No itching, burning, rashes, or lesions   LYMPH NODES: No enlarged glands  ENDOCRINE: No heat or cold intolerance; No hair loss  MUSCULOSKELETAL: No joint pain or swelling; No muscle, back, or extremity pain  PSYCHIATRIC: No depression, anxiety, mood swings, or difficulty sleeping  HEME/LYMPH: No easy bruising, or bleeding gums  ALLERGY AND IMMUNOLOGIC: No hives or eczema    Vital Signs Last 24 Hrs  T(C): 36.8 (02 Feb 2021 10:54), Max: 36.8 (02 Feb 2021 10:54)  T(F): 98.3 (02 Feb 2021 10:54), Max: 98.3 (02 Feb 2021 10:54)  HR: 74 (02 Feb 2021 10:54) (55 - 100)  BP: 114/68 (02 Feb 2021 10:54) (114/68 - 153/87)  BP(mean): --  RR: 18 (02 Feb 2021 10:54) (17 - 18)  SpO2: 96% (02 Feb 2021 10:54) (90% - 99%)    PHYSICAL EXAM:  GENERAL: NAD, well-groomed, well-developed  HEAD:  Atraumatic, Normocephalic  EYES: EOMI, PERRLA, conjunctiva and sclera clear  ENMT: No tonsillar erythema, exudates, or enlargement; Moist mucous membranes, Good dentition, No lesions  NECK: Supple, No JVD, Normal thyroid  NERVOUS SYSTEM:  Alert & Oriented X3, Good concentration; Motor Strength 5/5 B/L upper and lower extremities; DTRs 2+ intact and symmetric  CHEST/LUNG: Clear to percussion bilaterally; No rales, rhonchi, wheezing, or rubs  HEART: Regular rate and rhythm; No murmurs, rubs, or gallops  ABDOMEN: Soft, Nontender, Nondistended; Bowel sounds present  EXTREMITIES:  2+ Peripheral Pulses, No clubbing, cyanosis, or edema  LYMPH: No lymphadenopathy noted  SKIN: No rashes or lesions    LABS:                        13.6   6.15  )-----------( 310      ( 31 Jan 2021 14:39 )             40.4     02-02    x   |  x   |  x   ----------------------------<  x   x    |  x   |  0.84    Ca    7.5<L>      31 Jan 2021 14:39    TPro  6.0  /  Alb  2.2<L>  /  TBili  0.3  /  DBili  .09  /  AST  19  /  ALT  25  /  AlkPhos  123<H>  02-02    PT/INR - ( 31 Jan 2021 14:39 )   PT: 15.3 sec;   INR: 1.34 ratio         PTT - ( 31 Jan 2021 14:39 )  PTT:32.4 sec    CAPILLARY BLOOD GLUCOSE        CULTURES:    HEMOGLOBIN A1C:    CHOLESTEROL:        RADIOLOGY & ADDITIONAL TESTS:

## 2021-02-02 NOTE — PROGRESS NOTE ADULT - SUBJECTIVE AND OBJECTIVE BOX
INTERVAL HPI:  80F Akan speaking  HTN and S/P lumbar Laminectomy,  c/o fever and SOB  for few days .    Tested COVID-19 (+) 6 days ago, started having shortness of breath few days ago with worsening dyspnea on exertion . Also reports increasing weakness.  In ED with temperature of 101, SPO2 90% on room air and CXT with bilateral infiltrates.  Non smoker.    OVERNIGHT EVENTS:  Resting    Vital Signs Last 24 Hrs  T(C): 37.1 (02 Feb 2021 16:01), Max: 37.1 (02 Feb 2021 16:01)  T(F): 98.7 (02 Feb 2021 16:01), Max: 98.7 (02 Feb 2021 16:01)  HR: 65 (02 Feb 2021 20:50) (55 - 100)  BP: 119/71 (02 Feb 2021 16:01) (114/68 - 132/76)  BP(mean): --  RR: 18 (02 Feb 2021 20:50) (17 - 18)  SpO2: 96% (02 Feb 2021 20:50) (90% - 100%)    PHYSICAL EXAM:  GEN:        Resting, comfortable.  HEENT:    Normal.    RESP:        no distress  CVS:          Regular rate and rhythm.   ABD:         Soft, non-tender, non-distended;     MEDICATIONS  (STANDING):  amLODIPine   Tablet 5 milliGRAM(s) Oral daily  ascorbic acid 500 milliGRAM(s) Oral two times a day  enoxaparin Injectable 70 milliGRAM(s) SubCutaneous two times a day  influenza   Vaccine 0.5 milliLiter(s) IntraMuscular once  pantoprazole    Tablet 40 milliGRAM(s) Oral before breakfast  zinc sulfate 220 milliGRAM(s) Oral daily    MEDICATIONS  (PRN):  acetaminophen   Tablet .. 650 milliGRAM(s) Oral every 4 hours PRN Temp greater or equal to 38.5C (101.3F)  ALBUTerol    90 MICROgram(s) HFA Inhaler 2 Puff(s) Inhalation every 4 hours PRN Shortness of Breath and/or Wheezing  benzonatate 100 milliGRAM(s) Oral three times a day PRN Cough  calcium carbonate    500 mG (Tums) Chewable 1 Tablet(s) Chew three times a day PRN Heartburn    LABS:    02-02    x   |  x   |  x   ----------------------------<  x   x    |  x   |  0.84      TPro  6.0  /  Alb  2.2<L>  /  TBili  0.3  /  DBili  .09  /  AST  19  /  ALT  25  /  AlkPhos  123<H>  02-02    ASSESSMENT AND PLAN:  ·	SOB.  ·	Hypoxia.  ·	COVID pneumonia.  ·	HTN    Changed to high flow.  Titrate down FIo2 as tolerated.

## 2021-02-02 NOTE — PHARMACOTHERAPY INTERVENTION NOTE - COMMENTS
Recommended changing dexamethasone from q8h to q24h for COVID-19, in line with [facility] guideline recommendations.

## 2021-02-03 LAB
ALBUMIN SERPL ELPH-MCNC: 2.3 G/DL — LOW (ref 3.3–5)
ALP SERPL-CCNC: 130 U/L — HIGH (ref 40–120)
ALT FLD-CCNC: 24 U/L — SIGNIFICANT CHANGE UP (ref 12–78)
AST SERPL-CCNC: 31 U/L — SIGNIFICANT CHANGE UP (ref 15–37)
BILIRUB DIRECT SERPL-MCNC: 0.19 MG/DL — SIGNIFICANT CHANGE UP (ref 0.05–0.2)
BILIRUB INDIRECT FLD-MCNC: 0.5 MG/DL — SIGNIFICANT CHANGE UP (ref 0.2–1)
BILIRUB SERPL-MCNC: 0.7 MG/DL — SIGNIFICANT CHANGE UP (ref 0.2–1.2)
CREAT SERPL-MCNC: 0.8 MG/DL — SIGNIFICANT CHANGE UP (ref 0.5–1.3)
PROT SERPL-MCNC: 6.1 GM/DL — SIGNIFICANT CHANGE UP (ref 6–8.3)

## 2021-02-03 RX ADMIN — ENOXAPARIN SODIUM 70 MILLIGRAM(S): 100 INJECTION SUBCUTANEOUS at 04:09

## 2021-02-03 RX ADMIN — AMLODIPINE BESYLATE 5 MILLIGRAM(S): 2.5 TABLET ORAL at 04:09

## 2021-02-03 RX ADMIN — Medication 6 MILLIGRAM(S): at 17:32

## 2021-02-03 RX ADMIN — ALBUTEROL 2 PUFF(S): 90 AEROSOL, METERED ORAL at 17:32

## 2021-02-03 RX ADMIN — Medication 500 MILLIGRAM(S): at 17:33

## 2021-02-03 RX ADMIN — ENOXAPARIN SODIUM 70 MILLIGRAM(S): 100 INJECTION SUBCUTANEOUS at 17:32

## 2021-02-03 RX ADMIN — ZINC SULFATE TAB 220 MG (50 MG ZINC EQUIVALENT) 220 MILLIGRAM(S): 220 (50 ZN) TAB at 13:29

## 2021-02-03 RX ADMIN — Medication 500 MILLIGRAM(S): at 04:09

## 2021-02-03 RX ADMIN — PANTOPRAZOLE SODIUM 40 MILLIGRAM(S): 20 TABLET, DELAYED RELEASE ORAL at 06:30

## 2021-02-03 NOTE — PROGRESS NOTE ADULT - SUBJECTIVE AND OBJECTIVE BOX
INTERVAL HPI:   80F Akan speaking  HTN and S/P lumbar Laminectomy,  c/o fever and SOB  for few days .    Tested COVID-19 (+) 6 days ago, started having shortness of breath few days ago with worsening dyspnea on exertion . Also reports increasing weakness.  In ED with temperature of 101, SPO2 90% on room air and CXT with bilateral infiltrates.  Non smoker.    OVERNIGHT EVENTS:  REsting    Vital Signs Last 24 Hrs  T(C): 37.6 (03 Feb 2021 16:07), Max: 37.6 (03 Feb 2021 16:07)  T(F): 99.6 (03 Feb 2021 16:07), Max: 99.6 (03 Feb 2021 16:07)  HR: 89 (03 Feb 2021 16:07) (64 - 99)  BP: 126/77 (03 Feb 2021 16:07) (106/69 - 132/75)  BP(mean): --  RR: 20 (03 Feb 2021 20:35) (19 - 20)  SpO2: 95% (03 Feb 2021 20:35) (93% - 98%)    PHYSICAL EXAM:  GEN:       comfortable.  HEENT:    Normal.    RESP:         no distress  CVS:             Regular rate and rhythm.   ABD:         Soft, non-tender, non-distended;     MEDICATIONS  (STANDING):  amLODIPine   Tablet 5 milliGRAM(s) Oral daily  ascorbic acid 500 milliGRAM(s) Oral two times a day  enoxaparin Injectable 70 milliGRAM(s) SubCutaneous two times a day  influenza   Vaccine 0.5 milliLiter(s) IntraMuscular once  pantoprazole    Tablet 40 milliGRAM(s) Oral before breakfast  zinc sulfate 220 milliGRAM(s) Oral daily    MEDICATIONS  (PRN):  acetaminophen   Tablet .. 650 milliGRAM(s) Oral every 4 hours PRN Temp greater or equal to 38.5C (101.3F)  ALBUTerol    90 MICROgram(s) HFA Inhaler 2 Puff(s) Inhalation every 4 hours PRN Shortness of Breath and/or Wheezing  benzonatate 100 milliGRAM(s) Oral three times a day PRN Cough  calcium carbonate    500 mG (Tums) Chewable 1 Tablet(s) Chew three times a day PRN Heartburn    LABS:    02-03    x   |  x   |  x   ----------------------------<  x   x    |  x   |  0.80      TPro  6.1  /  Alb  2.3<L>  /  TBili  0.7  /  DBili  .19  /  AST  31  /  ALT  24  /  AlkPhos  130<H>  02-03    ASSESSMENT AND PLAN:  ·	SOB.  ·	Hypoxia.  ·	COVID pneumonia.  ·	HTN    SPO2 95% on 70% FIO2 with high flow

## 2021-02-03 NOTE — PROGRESS NOTE ADULT - SUBJECTIVE AND OBJECTIVE BOX
Patient is a 80y old  Female who presents with a chief complaint of sob (02 Feb 2021 22:10)      INTERVAL HPI/OVERNIGHT EVENTS:  pt is still required high flow O2  MEDICATIONS  (STANDING):  amLODIPine   Tablet 5 milliGRAM(s) Oral daily  ascorbic acid 500 milliGRAM(s) Oral two times a day  dexAMETHasone     Tablet 6 milliGRAM(s) Oral once  enoxaparin Injectable 70 milliGRAM(s) SubCutaneous two times a day  influenza   Vaccine 0.5 milliLiter(s) IntraMuscular once  pantoprazole    Tablet 40 milliGRAM(s) Oral before breakfast  zinc sulfate 220 milliGRAM(s) Oral daily    MEDICATIONS  (PRN):  acetaminophen   Tablet .. 650 milliGRAM(s) Oral every 4 hours PRN Temp greater or equal to 38.5C (101.3F)  ALBUTerol    90 MICROgram(s) HFA Inhaler 2 Puff(s) Inhalation every 4 hours PRN Shortness of Breath and/or Wheezing  benzonatate 100 milliGRAM(s) Oral three times a day PRN Cough  calcium carbonate    500 mG (Tums) Chewable 1 Tablet(s) Chew three times a day PRN Heartburn      Allergies    No Known Allergies    Intolerances        REVIEW OF SYSTEMS:  CONSTITUTIONAL: No fever, weight loss, or fatigue  EYES: No eye pain, visual disturbances, or discharge  ENMT:  No difficulty hearing, tinnitus, vertigo; No sinus or throat pain  NECK: No pain or stiffness  BREASTS: No pain, masses, or nipple discharge  RESPIRATORY: No cough, wheezing, chills or hemoptysis; No shortness of breath  CARDIOVASCULAR: No chest pain, palpitations, dizziness, or leg swelling  GASTROINTESTINAL: No abdominal or epigastric pain. No nausea, vomiting, or hematemesis; No diarrhea or constipation. No melena or hematochezia.  GENITOURINARY: No dysuria, frequency, hematuria, or incontinence  NEUROLOGICAL: No headaches, memory loss, loss of strength, numbness, or tremors  SKIN: No itching, burning, rashes, or lesions   LYMPH NODES: No enlarged glands  ENDOCRINE: No heat or cold intolerance; No hair loss  MUSCULOSKELETAL: No joint pain or swelling; No muscle, back, or extremity pain  PSYCHIATRIC: No depression, anxiety, mood swings, or difficulty sleeping  HEME/LYMPH: No easy bruising, or bleeding gums  ALLERGY AND IMMUNOLOGIC: No hives or eczema    Vital Signs Last 24 Hrs  T(C): 36.8 (03 Feb 2021 11:10), Max: 37.1 (02 Feb 2021 16:01)  T(F): 98.2 (03 Feb 2021 11:10), Max: 98.7 (02 Feb 2021 16:01)  HR: 99 (03 Feb 2021 11:10) (64 - 99)  BP: 106/69 (03 Feb 2021 11:10) (106/69 - 132/75)  BP(mean): --  RR: 19 (03 Feb 2021 11:10) (18 - 20)  SpO2: 98% (03 Feb 2021 11:10) (93% - 100%)    PHYSICAL EXAM:  GENERAL: NAD, well-groomed, well-developed  HEAD:  Atraumatic, Normocephalic  EYES: EOMI, PERRLA, conjunctiva and sclera clear  ENMT: No tonsillar erythema, exudates, or enlargement; Moist mucous membranes, Good dentition, No lesions  NECK: Supple, No JVD, Normal thyroid  NERVOUS SYSTEM:  Alert & Oriented X3, Good concentration; Motor Strength 5/5 B/L upper and lower extremities; DTRs 2+ intact and symmetric  CHEST/LUNG: Clear to percussion bilaterally; No rales, rhonchi, wheezing, or rubs  HEART: Regular rate and rhythm; No murmurs, rubs, or gallops  ABDOMEN: Soft, Nontender, Nondistended; Bowel sounds present  EXTREMITIES:  2+ Peripheral Pulses, No clubbing, cyanosis, or edema  LYMPH: No lymphadenopathy noted  SKIN: No rashes or lesions    LABS:    02-03    x   |  x   |  x   ----------------------------<  x   x    |  x   |  0.80      TPro  6.1  /  Alb  2.3<L>  /  TBili  0.7  /  DBili  .19  /  AST  31  /  ALT  24  /  AlkPhos  130<H>  02-03        CAPILLARY BLOOD GLUCOSE        CULTURES:    HEMOGLOBIN A1C:    CHOLESTEROL:        RADIOLOGY & ADDITIONAL TESTS:

## 2021-02-03 NOTE — PROGRESS NOTE ADULT - SUBJECTIVE AND OBJECTIVE BOX
HPI:  80F Akan speaking  PMHX HTN c/o fever for few days and get  tested COVID-19 (+) 6 days ago but the past few days she is having shortness of breath as well  as worsening dyspnea on exertion now presenting with hypoxia 90% on RA. Pt stated she is feeling tired and weak unable to ambulate. (24 Jan 2021 22:09)  has covid 19   now on hi ag     Allergies    No Known Allergies    Intolerances        MEDICATIONS  (STANDING):  amLODIPine   Tablet 5 milliGRAM(s) Oral daily  ascorbic acid 500 milliGRAM(s) Oral two times a day  enoxaparin Injectable 70 milliGRAM(s) SubCutaneous two times a day  influenza   Vaccine 0.5 milliLiter(s) IntraMuscular once  pantoprazole    Tablet 40 milliGRAM(s) Oral before breakfast  zinc sulfate 220 milliGRAM(s) Oral daily    MEDICATIONS  (PRN):  acetaminophen   Tablet .. 650 milliGRAM(s) Oral every 4 hours PRN Temp greater or equal to 38.5C (101.3F)  ALBUTerol    90 MICROgram(s) HFA Inhaler 2 Puff(s) Inhalation every 4 hours PRN Shortness of Breath and/or Wheezing  benzonatate 100 milliGRAM(s) Oral three times a day PRN Cough  calcium carbonate    500 mG (Tums) Chewable 1 Tablet(s) Chew three times a day PRN Heartburn      REVIEW OF SYSTEMS:    still cough and still short of breath   no nausea vomiting diarrhea  no chest abdominal pain     VITAL SIGNS:  T(C): 37.6 (02-03-21 @ 16:07), Max: 37.6 (02-03-21 @ 16:07)  T(F): 99.6 (02-03-21 @ 16:07), Max: 99.6 (02-03-21 @ 16:07)  HR: 89 (02-03-21 @ 16:07) (64 - 99)  BP: 126/77 (02-03-21 @ 16:07) (106/69 - 132/75)  RR: 20 (02-03-21 @ 20:35) (19 - 20)  SpO2: 95% (02-03-21 @ 20:35) (93% - 98%)  Wt(kg): --    PHYSICAL EXAM:    GENERAL: not in any distress  HEENT: Neck is supple, normocephalic, atraumatic   CHEST/LUNG: Clear to auscultation bilaterally; No rales, rhonchi, wheezing  HEART: Regular rate and rhythm; No murmurs, rubs, or gallops  ABDOMEN: Soft, Nontender, Nondistended; Bowel sounds present, no rebound   EXTREMITIES:  2+ Peripheral Pulses, No clubbing, cyanosis, or edema  SKIN: No rashes or lesions  BACK: no pressor sore   NERVOUS SYSTEM:  Alert & Oriented X3,   LABS:     02-03    x   |  x   |  x   ----------------------------<  x   x    |  x   |  0.80      TPro  6.1  /  Alb  2.3<L>  /  TBili  0.7  /  DBili  .19  /  AST  31  /  ALT  24  /  AlkPhos  130<H>  02-03    LIVER FUNCTIONS - ( 03 Feb 2021 09:14 )  Alb: 2.3 g/dL / Pro: 6.1 gm/dL / ALK PHOS: 130 U/L / ALT: 24 U/L / AST: 31 U/L / GGT: x                                                 Radiology:    < from: Xray Chest 1 View- PORTABLE-Urgent (Xray Chest 1 View- PORTABLE-Urgent .) (01.31.21 @ 14:56) >  EXAM:  XR CHEST PORTABLE URGENT 1V                            PROCEDURE DATE:  01/31/2021          INTERPRETATION:  AP chest with comparison 1/24/2021.    Clinical indications: Chest pain.    IMPRESSION: There is multifocal airspace disease compatible with multifocal bilateral pneumonia that is relatively unchanged compared to the prior study. The heart is normal in size.            ANNIA MERCADO MD; Attending Radiologist  This document has been electronically signed. Jan 31 2021  3:25PM    < end of copied text >

## 2021-02-04 LAB
ALBUMIN SERPL ELPH-MCNC: 2.2 G/DL — LOW (ref 3.3–5)
ALP SERPL-CCNC: 102 U/L — SIGNIFICANT CHANGE UP (ref 40–120)
ALT FLD-CCNC: 23 U/L — SIGNIFICANT CHANGE UP (ref 12–78)
ANION GAP SERPL CALC-SCNC: 6 MMOL/L — SIGNIFICANT CHANGE UP (ref 5–17)
AST SERPL-CCNC: 23 U/L — SIGNIFICANT CHANGE UP (ref 15–37)
BILIRUB DIRECT SERPL-MCNC: 0.18 MG/DL — SIGNIFICANT CHANGE UP (ref 0.05–0.2)
BILIRUB INDIRECT FLD-MCNC: 0.4 MG/DL — SIGNIFICANT CHANGE UP (ref 0.2–1)
BILIRUB SERPL-MCNC: 0.6 MG/DL — SIGNIFICANT CHANGE UP (ref 0.2–1.2)
BUN SERPL-MCNC: 15 MG/DL — SIGNIFICANT CHANGE UP (ref 7–23)
CALCIUM SERPL-MCNC: 7.5 MG/DL — LOW (ref 8.5–10.1)
CHLORIDE SERPL-SCNC: 110 MMOL/L — HIGH (ref 96–108)
CO2 SERPL-SCNC: 28 MMOL/L — SIGNIFICANT CHANGE UP (ref 22–31)
CREAT SERPL-MCNC: 0.73 MG/DL — SIGNIFICANT CHANGE UP (ref 0.5–1.3)
CRP SERPL-MCNC: 6.85 MG/DL — HIGH (ref 0–0.4)
D DIMER BLD IA.RAPID-MCNC: 514 NG/ML DDU — HIGH
FERRITIN SERPL-MCNC: 613 NG/ML — HIGH (ref 15–150)
FLUAV AG NPH QL: SIGNIFICANT CHANGE UP
FLUBV AG NPH QL: SIGNIFICANT CHANGE UP
GLUCOSE SERPL-MCNC: 178 MG/DL — HIGH (ref 70–99)
HCT VFR BLD CALC: 41.1 % — SIGNIFICANT CHANGE UP (ref 34.5–45)
HGB BLD-MCNC: 13.7 G/DL — SIGNIFICANT CHANGE UP (ref 11.5–15.5)
MCHC RBC-ENTMCNC: 29.5 PG — SIGNIFICANT CHANGE UP (ref 27–34)
MCHC RBC-ENTMCNC: 33.3 GM/DL — SIGNIFICANT CHANGE UP (ref 32–36)
MCV RBC AUTO: 88.6 FL — SIGNIFICANT CHANGE UP (ref 80–100)
NRBC # BLD: 0 /100 WBCS — SIGNIFICANT CHANGE UP (ref 0–0)
PLATELET # BLD AUTO: 254 K/UL — SIGNIFICANT CHANGE UP (ref 150–400)
POTASSIUM SERPL-MCNC: 3.8 MMOL/L — SIGNIFICANT CHANGE UP (ref 3.5–5.3)
POTASSIUM SERPL-SCNC: 3.8 MMOL/L — SIGNIFICANT CHANGE UP (ref 3.5–5.3)
PROT SERPL-MCNC: 5.8 GM/DL — LOW (ref 6–8.3)
RBC # BLD: 4.64 M/UL — SIGNIFICANT CHANGE UP (ref 3.8–5.2)
RBC # FLD: 14.3 % — SIGNIFICANT CHANGE UP (ref 10.3–14.5)
SARS-COV-2 RNA SPEC QL NAA+PROBE: DETECTED
SODIUM SERPL-SCNC: 144 MMOL/L — SIGNIFICANT CHANGE UP (ref 135–145)
WBC # BLD: 5.06 K/UL — SIGNIFICANT CHANGE UP (ref 3.8–10.5)
WBC # FLD AUTO: 5.06 K/UL — SIGNIFICANT CHANGE UP (ref 3.8–10.5)

## 2021-02-04 PROCEDURE — 71045 X-RAY EXAM CHEST 1 VIEW: CPT | Mod: 26,76

## 2021-02-04 RX ADMIN — Medication 500 MILLIGRAM(S): at 05:26

## 2021-02-04 RX ADMIN — Medication 500 MILLIGRAM(S): at 17:19

## 2021-02-04 RX ADMIN — PANTOPRAZOLE SODIUM 40 MILLIGRAM(S): 20 TABLET, DELAYED RELEASE ORAL at 05:26

## 2021-02-04 RX ADMIN — ENOXAPARIN SODIUM 70 MILLIGRAM(S): 100 INJECTION SUBCUTANEOUS at 17:19

## 2021-02-04 RX ADMIN — ZINC SULFATE TAB 220 MG (50 MG ZINC EQUIVALENT) 220 MILLIGRAM(S): 220 (50 ZN) TAB at 11:31

## 2021-02-04 RX ADMIN — ENOXAPARIN SODIUM 70 MILLIGRAM(S): 100 INJECTION SUBCUTANEOUS at 05:26

## 2021-02-04 RX ADMIN — AMLODIPINE BESYLATE 5 MILLIGRAM(S): 2.5 TABLET ORAL at 05:26

## 2021-02-04 NOTE — PROGRESS NOTE ADULT - SUBJECTIVE AND OBJECTIVE BOX
Patient is a 80y old  Female who presents with a chief complaint of sob (03 Feb 2021 21:40)      INTERVAL HPI/OVERNIGHT EVENTS:  pt still requiring high flow O2  MEDICATIONS  (STANDING):  amLODIPine   Tablet 5 milliGRAM(s) Oral daily  ascorbic acid 500 milliGRAM(s) Oral two times a day  enoxaparin Injectable 70 milliGRAM(s) SubCutaneous two times a day  influenza   Vaccine 0.5 milliLiter(s) IntraMuscular once  pantoprazole    Tablet 40 milliGRAM(s) Oral before breakfast  zinc sulfate 220 milliGRAM(s) Oral daily    MEDICATIONS  (PRN):  acetaminophen   Tablet .. 650 milliGRAM(s) Oral every 4 hours PRN Temp greater or equal to 38.5C (101.3F)  ALBUTerol    90 MICROgram(s) HFA Inhaler 2 Puff(s) Inhalation every 4 hours PRN Shortness of Breath and/or Wheezing  benzonatate 100 milliGRAM(s) Oral three times a day PRN Cough  calcium carbonate    500 mG (Tums) Chewable 1 Tablet(s) Chew three times a day PRN Heartburn      Allergies    No Known Allergies    Intolerances        REVIEW OF SYSTEMS:  CONSTITUTIONAL: No fever, weight loss, or fatigue  EYES: No eye pain, visual disturbances, or discharge  ENMT:  No difficulty hearing, tinnitus, vertigo; No sinus or throat pain  NECK: No pain or stiffness  BREASTS: No pain, masses, or nipple discharge  RESPIRATORY: No cough, wheezing, chills or hemoptysis; No shortness of breath  CARDIOVASCULAR: No chest pain, palpitations, dizziness, or leg swelling  GASTROINTESTINAL: No abdominal or epigastric pain. No nausea, vomiting, or hematemesis; No diarrhea or constipation. No melena or hematochezia.  GENITOURINARY: No dysuria, frequency, hematuria, or incontinence  NEUROLOGICAL: No headaches, memory loss, loss of strength, numbness, or tremors  SKIN: No itching, burning, rashes, or lesions   LYMPH NODES: No enlarged glands  ENDOCRINE: No heat or cold intolerance; No hair loss  MUSCULOSKELETAL: No joint pain or swelling; No muscle, back, or extremity pain  PSYCHIATRIC: No depression, anxiety, mood swings, or difficulty sleeping  HEME/LYMPH: No easy bruising, or bleeding gums  ALLERGY AND IMMUNOLOGIC: No hives or eczema    Vital Signs Last 24 Hrs  T(C): 36.6 (04 Feb 2021 10:59), Max: 37.6 (03 Feb 2021 16:07)  T(F): 97.9 (04 Feb 2021 10:59), Max: 99.6 (03 Feb 2021 16:07)  HR: 81 (04 Feb 2021 10:59) (80 - 89)  BP: 108/64 (04 Feb 2021 10:59) (108/64 - 126/77)  BP(mean): --  RR: 18 (04 Feb 2021 10:59) (16 - 20)  SpO2: 97% (04 Feb 2021 10:59) (93% - 99%)    PHYSICAL EXAM:  GENERAL: NAD, well-groomed, well-developed  HEAD:  Atraumatic, Normocephalic  EYES: EOMI, PERRLA, conjunctiva and sclera clear  ENMT: No tonsillar erythema, exudates, or enlargement; Moist mucous membranes, Good dentition, No lesions  NECK: Supple, No JVD, Normal thyroid  NERVOUS SYSTEM:  Alert & Oriented X3, Good concentration; Motor Strength 5/5 B/L upper and lower extremities; DTRs 2+ intact and symmetric  CHEST/LUNG: Clear to percussion bilaterally; No rales, rhonchi, wheezing, or rubs  HEART: Regular rate and rhythm; No murmurs, rubs, or gallops  ABDOMEN: Soft, Nontender, Nondistended; Bowel sounds present  EXTREMITIES:  2+ Peripheral Pulses, No clubbing, cyanosis, or edema  LYMPH: No lymphadenopathy noted  SKIN: No rashes or lesions    LABS:                        13.7   5.06  )-----------( 254      ( 04 Feb 2021 06:35 )             41.1     02-04    144  |  110<H>  |  15  ----------------------------<  178<H>  3.8   |  28  |  0.73    Ca    7.5<L>      04 Feb 2021 06:35    TPro  5.8<L>  /  Alb  2.2<L>  /  TBili  0.6  /  DBili  .18  /  AST  23  /  ALT  23  /  AlkPhos  102  02-04        CAPILLARY BLOOD GLUCOSE        CULTURES:    HEMOGLOBIN A1C:    CHOLESTEROL:        RADIOLOGY & ADDITIONAL TESTS:

## 2021-02-04 NOTE — PROGRESS NOTE ADULT - SUBJECTIVE AND OBJECTIVE BOX
INTERVAL HPI:  80F Akan speaking  HTN and S/P lumbar Laminectomy,  c/o fever and SOB  for few days .    Tested COVID-19 (+) 6 days ago, started having shortness of breath few days ago with worsening dyspnea on exertion . Also reports increasing weakness.  In ED with temperature of 101, SPO2 90% on room air and CXT with bilateral infiltrates.  Non smoker.    OVERNIGHT EVENTS:  Still on high flow    Vital Signs Last 24 Hrs  T(C): 36.6 (04 Feb 2021 10:59), Max: 37.6 (03 Feb 2021 16:07)  T(F): 97.9 (04 Feb 2021 10:59), Max: 99.6 (03 Feb 2021 16:07)  HR: 79 (04 Feb 2021 12:05) (79 - 89)  BP: 108/64 (04 Feb 2021 10:59) (108/64 - 126/77)  BP(mean): --  RR: 18 (04 Feb 2021 12:05) (16 - 20)  SpO2: 94% (04 Feb 2021 12:05) (93% - 99%)    PHYSICAL EXAM:  GEN:         Awake, responsive and comfortable.  HEENT:    Normal.    RESP:        no distress  CVS:           Regular rate and rhythm.   ABD:         Soft, non-tender, non-distended;     MEDICATIONS  (STANDING):  amLODIPine   Tablet 5 milliGRAM(s) Oral daily  ascorbic acid 500 milliGRAM(s) Oral two times a day  enoxaparin Injectable 70 milliGRAM(s) SubCutaneous two times a day  influenza   Vaccine 0.5 milliLiter(s) IntraMuscular once  pantoprazole    Tablet 40 milliGRAM(s) Oral before breakfast  zinc sulfate 220 milliGRAM(s) Oral daily    MEDICATIONS  (PRN):  acetaminophen   Tablet .. 650 milliGRAM(s) Oral every 4 hours PRN Temp greater or equal to 38.5C (101.3F)  ALBUTerol    90 MICROgram(s) HFA Inhaler 2 Puff(s) Inhalation every 4 hours PRN Shortness of Breath and/or Wheezing  benzonatate 100 milliGRAM(s) Oral three times a day PRN Cough  calcium carbonate    500 mG (Tums) Chewable 1 Tablet(s) Chew three times a day PRN Heartburn    LABS:                        13.7   5.06  )-----------( 254      ( 04 Feb 2021 06:35 )             41.1     02-04    144  |  110<H>  |  15  ----------------------------<  178<H>  3.8   |  28  |  0.73    Ca    7.5<L>      04 Feb 2021 06:35    TPro  5.8<L>  /  Alb  2.2<L>  /  TBili  0.6  /  DBili  .18  /  AST  23  /  ALT  23  /  AlkPhos  102  02-04    ASSESSMENT AND PLAN:  ·	SOB.  ·	Hypoxia.  ·	COVID pneumonia.  ·	HTN    Titrate down FIo2 as tolerated.  Completed Remdesivir and Dexamethasone.  Continue Lovenox.

## 2021-02-05 LAB
ALBUMIN SERPL ELPH-MCNC: 2.2 G/DL — LOW (ref 3.3–5)
ALP SERPL-CCNC: 95 U/L — SIGNIFICANT CHANGE UP (ref 40–120)
ALT FLD-CCNC: 24 U/L — SIGNIFICANT CHANGE UP (ref 12–78)
AST SERPL-CCNC: 21 U/L — SIGNIFICANT CHANGE UP (ref 15–37)
BILIRUB DIRECT SERPL-MCNC: 0.13 MG/DL — SIGNIFICANT CHANGE UP (ref 0.05–0.2)
BILIRUB INDIRECT FLD-MCNC: 0.3 MG/DL — SIGNIFICANT CHANGE UP (ref 0.2–1)
BILIRUB SERPL-MCNC: 0.4 MG/DL — SIGNIFICANT CHANGE UP (ref 0.2–1.2)
CREAT SERPL-MCNC: 0.73 MG/DL — SIGNIFICANT CHANGE UP (ref 0.5–1.3)
PROT SERPL-MCNC: 6.1 GM/DL — SIGNIFICANT CHANGE UP (ref 6–8.3)

## 2021-02-05 RX ADMIN — ZINC SULFATE TAB 220 MG (50 MG ZINC EQUIVALENT) 220 MILLIGRAM(S): 220 (50 ZN) TAB at 14:31

## 2021-02-05 RX ADMIN — ENOXAPARIN SODIUM 70 MILLIGRAM(S): 100 INJECTION SUBCUTANEOUS at 06:34

## 2021-02-05 RX ADMIN — ENOXAPARIN SODIUM 70 MILLIGRAM(S): 100 INJECTION SUBCUTANEOUS at 18:34

## 2021-02-05 RX ADMIN — PANTOPRAZOLE SODIUM 40 MILLIGRAM(S): 20 TABLET, DELAYED RELEASE ORAL at 06:34

## 2021-02-05 RX ADMIN — Medication 500 MILLIGRAM(S): at 18:34

## 2021-02-05 RX ADMIN — Medication 500 MILLIGRAM(S): at 06:33

## 2021-02-05 NOTE — PROGRESS NOTE ADULT - SUBJECTIVE AND OBJECTIVE BOX
INTERVAL HPI:   80F Akan speaking  HTN and S/P lumbar Laminectomy,  c/o fever and SOB  for few days .    Tested COVID-19 (+) 6 days ago, started having shortness of breath few days ago with worsening dyspnea on exertion . Also reports increasing weakness.  In ED with temperature of 101, SPO2 90% on room air and CXT with bilateral infiltrates.  Non smoker.    OVERNIGHT EVENTS:  Comfortable    Vital Signs Last 24 Hrs  T(C): 36.9 (05 Feb 2021 10:58), Max: 37 (05 Feb 2021 05:46)  T(F): 98.5 (05 Feb 2021 10:58), Max: 98.6 (05 Feb 2021 05:46)  HR: 72 (05 Feb 2021 10:58) (72 - 90)  BP: 117/72 (05 Feb 2021 10:58) (103/62 - 117/72)  BP(mean): 87 (05 Feb 2021 10:58) (87 - 87)  RR: 23 (05 Feb 2021 10:58) (18 - 26)  SpO2: 97% (05 Feb 2021 10:58) (94% - 97%)    PHYSICAL EXAM:  GEN:         Awake, responsive and comfortable.  HEENT:    Normal.    RESP:       no distress  CVS:          Regular rate and rhythm.   ABD:         Soft, non-tender, non-distended;     MEDICATIONS  (STANDING):  amLODIPine   Tablet 5 milliGRAM(s) Oral daily  ascorbic acid 500 milliGRAM(s) Oral two times a day  enoxaparin Injectable 70 milliGRAM(s) SubCutaneous two times a day  influenza   Vaccine 0.5 milliLiter(s) IntraMuscular once  pantoprazole    Tablet 40 milliGRAM(s) Oral before breakfast  zinc sulfate 220 milliGRAM(s) Oral daily    MEDICATIONS  (PRN):  acetaminophen   Tablet .. 650 milliGRAM(s) Oral every 4 hours PRN Temp greater or equal to 38.5C (101.3F)  ALBUTerol    90 MICROgram(s) HFA Inhaler 2 Puff(s) Inhalation every 4 hours PRN Shortness of Breath and/or Wheezing  benzonatate 100 milliGRAM(s) Oral three times a day PRN Cough  calcium carbonate    500 mG (Tums) Chewable 1 Tablet(s) Chew three times a day PRN Heartburn    LABS:                        13.7   5.06  )-----------( 254      ( 04 Feb 2021 06:35 )             41.1     02-05    x   |  x   |  x   ----------------------------<  x   x    |  x   |  0.73    Ca    7.5<L>      04 Feb 2021 06:35    TPro  6.1  /  Alb  2.2<L>  /  TBili  0.4  /  DBili  .13  /  AST  21  /  ALT  24  /  AlkPhos  95  02-05    ASSESSMENT AND PLAN:  ·	SOB.  ·	Hypoxia.  ·	COVID pneumonia.  ·	HTN    SPO2 97-98% on 30 litre high flow with 50% FIO2.  titrate down as tolerated.  Completed Remdesivir and Dexamethasone.  Continue Lovenox.

## 2021-02-05 NOTE — CHART NOTE - NSCHARTNOTEFT_GEN_A_CORE
Called by the RN to evaluate the patient for chest pain . As per RN pt c/o to her that she was having CP .   80F Akan speaking  PMHX HTN c/o fever for few days and get  tested COVID-19 (+) 6 days ago but the past few days she is having shortness of breath as well  as worsening dyspnea on exertion now presenting with hypoxia 90% on RA. Pt stated she is feeling tired and weak unable to ambulate. (24 Jan 2021 22:09) as per HPI .   Upon arrival pt looks comfortable . NAD noted . Denies saying that she had any CP/SOB . Pt reports having dry lips . VSS , Hemodynamically stable . PE WNL. Son ,  Gagan was made aware of the event . He agrees with plan and care

## 2021-02-05 NOTE — PROGRESS NOTE ADULT - SUBJECTIVE AND OBJECTIVE BOX
Patient is a 80y old  Female who presents with a chief complaint of sob (05 Feb 2021 10:16)      INTERVAL HPI/OVERNIGHT EVENTS:  pt is stable  MEDICATIONS  (STANDING):  amLODIPine   Tablet 5 milliGRAM(s) Oral daily  ascorbic acid 500 milliGRAM(s) Oral two times a day  enoxaparin Injectable 70 milliGRAM(s) SubCutaneous two times a day  influenza   Vaccine 0.5 milliLiter(s) IntraMuscular once  pantoprazole    Tablet 40 milliGRAM(s) Oral before breakfast  zinc sulfate 220 milliGRAM(s) Oral daily    MEDICATIONS  (PRN):  acetaminophen   Tablet .. 650 milliGRAM(s) Oral every 4 hours PRN Temp greater or equal to 38.5C (101.3F)  ALBUTerol    90 MICROgram(s) HFA Inhaler 2 Puff(s) Inhalation every 4 hours PRN Shortness of Breath and/or Wheezing  benzonatate 100 milliGRAM(s) Oral three times a day PRN Cough  calcium carbonate    500 mG (Tums) Chewable 1 Tablet(s) Chew three times a day PRN Heartburn      Allergies    No Known Allergies    Intolerances        REVIEW OF SYSTEMS:  CONSTITUTIONAL: No fever, weight loss, or fatigue  EYES: No eye pain, visual disturbances, or discharge  ENMT:  No difficulty hearing, tinnitus, vertigo; No sinus or throat pain  NECK: No pain or stiffness  BREASTS: No pain, masses, or nipple discharge  RESPIRATORY: No cough, wheezing, chills or hemoptysis; No shortness of breath  CARDIOVASCULAR: No chest pain, palpitations, dizziness, or leg swelling  GASTROINTESTINAL: No abdominal or epigastric pain. No nausea, vomiting, or hematemesis; No diarrhea or constipation. No melena or hematochezia.  GENITOURINARY: No dysuria, frequency, hematuria, or incontinence  NEUROLOGICAL: No headaches, memory loss, loss of strength, numbness, or tremors  SKIN: No itching, burning, rashes, or lesions   LYMPH NODES: No enlarged glands  ENDOCRINE: No heat or cold intolerance; No hair loss  MUSCULOSKELETAL: No joint pain or swelling; No muscle, back, or extremity pain  PSYCHIATRIC: No depression, anxiety, mood swings, or difficulty sleeping  HEME/LYMPH: No easy bruising, or bleeding gums  ALLERGY AND IMMUNOLOGIC: No hives or eczema    Vital Signs Last 24 Hrs  T(C): 36.9 (05 Feb 2021 10:58), Max: 37 (05 Feb 2021 05:46)  T(F): 98.5 (05 Feb 2021 10:58), Max: 98.6 (05 Feb 2021 05:46)  HR: 72 (05 Feb 2021 10:58) (72 - 90)  BP: 117/72 (05 Feb 2021 10:58) (103/62 - 117/72)  BP(mean): 87 (05 Feb 2021 10:58) (87 - 87)  RR: 23 (05 Feb 2021 10:58) (18 - 26)  SpO2: 97% (05 Feb 2021 10:58) (94% - 97%)    PHYSICAL EXAM:  GENERAL: NAD, well-groomed, well-developed  HEAD:  Atraumatic, Normocephalic  EYES: EOMI, PERRLA, conjunctiva and sclera clear  ENMT: No tonsillar erythema, exudates, or enlargement; Moist mucous membranes, Good dentition, No lesions  NECK: Supple, No JVD, Normal thyroid  NERVOUS SYSTEM:  Alert & Oriented X3, Good concentration; Motor Strength 5/5 B/L upper and lower extremities; DTRs 2+ intact and symmetric  CHEST/LUNG: Clear to percussion bilaterally; No rales, rhonchi, wheezing, or rubs  HEART: Regular rate and rhythm; No murmurs, rubs, or gallops  ABDOMEN: Soft, Nontender, Nondistended; Bowel sounds present  EXTREMITIES:  2+ Peripheral Pulses, No clubbing, cyanosis, or edema  LYMPH: No lymphadenopathy noted  SKIN: No rashes or lesions    LABS:                        13.7   5.06  )-----------( 254      ( 04 Feb 2021 06:35 )             41.1     02-05    x   |  x   |  x   ----------------------------<  x   x    |  x   |  0.73    Ca    7.5<L>      04 Feb 2021 06:35    TPro  6.1  /  Alb  2.2<L>  /  TBili  0.4  /  DBili  .13  /  AST  21  /  ALT  24  /  AlkPhos  95  02-05        CAPILLARY BLOOD GLUCOSE        CULTURES:    HEMOGLOBIN A1C:    CHOLESTEROL:          RADIOLOGY & ADDITIONAL TESTS:    < from: Xray Chest 1 View- PORTABLE-Routine (Xray Chest 1 View- PORTABLE-Routine .) (02.04.21 @ 14:25) >      < end of copied text >  < from: Xray Chest 1 View- PORTABLE-Routine (Xray Chest 1 View- PORTABLE-Routine .) (02.04.21 @ 14:25) >  EXAM:  XR CHEST PORTABLE ROUTINE 1V                            PROCEDURE DATE:  02/04/2021          INTERPRETATION:  Chest one view    HISTORY: Pneumonia    COMPARISON STUDY: Earlier the same day    Frontal expiratory view of the chest shows the heart to be similar in size. The lungs show slight decrease of patchy infiltrates and there is no evidence of pneumothorax nor pleural effusion.    IMPRESSION:  Slight clearing of the lungs.    Thank you for the courtesy of this referral.            VARUN SWANN MD; Attending Interventional Radiologist  This document has been electronically signed. Feb 4 2021  5:52PM    < end of copied text >

## 2021-02-05 NOTE — PROGRESS NOTE ADULT - SUBJECTIVE AND OBJECTIVE BOX
HPI:  80F Akan speaking  PMHX HTN c/o fever for few days and get  tested COVID-19 (+) 6 days ago but the past few days she is having shortness of breath as well  as worsening dyspnea on exertion now presenting with hypoxia 90% on RA. Pt stated she is feeling tired and weak unable to ambulate. (24 Jan 2021 22:09)      Allergies    No Known Allergies    Intolerances        MEDICATIONS  (STANDING):  amLODIPine   Tablet 5 milliGRAM(s) Oral daily  ascorbic acid 500 milliGRAM(s) Oral two times a day  enoxaparin Injectable 70 milliGRAM(s) SubCutaneous two times a day  influenza   Vaccine 0.5 milliLiter(s) IntraMuscular once  pantoprazole    Tablet 40 milliGRAM(s) Oral before breakfast  zinc sulfate 220 milliGRAM(s) Oral daily    MEDICATIONS  (PRN):  acetaminophen   Tablet .. 650 milliGRAM(s) Oral every 4 hours PRN Temp greater or equal to 38.5C (101.3F)  ALBUTerol    90 MICROgram(s) HFA Inhaler 2 Puff(s) Inhalation every 4 hours PRN Shortness of Breath and/or Wheezing  benzonatate 100 milliGRAM(s) Oral three times a day PRN Cough  calcium carbonate    500 mG (Tums) Chewable 1 Tablet(s) Chew three times a day PRN Heartburn      REVIEW OF SYSTEMS:    CONSTITUTIONAL: No fever  RESPIRATORY:  SOB better   CARDIOVASCULAR: No chest pain, palpitations, dizziness  GASTROINTESTINAL: No abdominal pain. No nausea, vomiting, diarrhea  GENITOURINARY: No dysuria, increase frequency, hematuria, or incontinence  NEUROLOGICAL: No headaches,   EXTREMITY: No pedal edema BLE  SKIN: No itching, burning, rashes, or lesions     VITAL SIGNS:  T(C): 37 (02-05-21 @ 05:46), Max: 37 (02-05-21 @ 05:46)  T(F): 98.6 (02-05-21 @ 05:46), Max: 98.6 (02-05-21 @ 05:46)  HR: 76 (02-05-21 @ 08:33) (74 - 90)  BP: 103/62 (02-05-21 @ 05:46) (103/62 - 113/73)  RR: 22 (02-05-21 @ 08:33) (18 - 26)  SpO2: 95% (02-05-21 @ 08:33) (94% - 97%)  Wt(kg): --    PHYSICAL EXAM:    GENERAL:  mild tired on high flow , not in acute distress   HEENT: Neck is supple, normocephalic, atraumatic   CHEST/LUNG: Clear to percussion bilaterally; No rales, rhonchi, wheezing  HEART: Regular rate and rhythm; No murmurs, rubs, or gallops  ABDOMEN: Soft, Nontender, Nondistended; Bowel sounds present, no rebound   EXTREMITIES:  2+ Peripheral Pulses, No clubbing, cyanosis, or edema  GENITOURINARY:   SKIN: No rashes or lesions  BACK: no pressor sore   NERVOUS SYSTEM:  Alert & Oriented     LABS:                         13.7   5.06  )-----------( 254      ( 04 Feb 2021 06:35 )             41.1     02-05    x   |  x   |  x   ----------------------------<  x   x    |  x   |  0.73    Ca    7.5<L>      04 Feb 2021 06:35    TPro  6.1  /  Alb  2.2<L>  /  TBili  0.4  /  DBili  .13  /  AST  21  /  ALT  24  /  AlkPhos  95  02-05    LIVER FUNCTIONS - ( 05 Feb 2021 08:06 )  Alb: 2.2 g/dL / Pro: 6.1 gm/dL / ALK PHOS: 95 U/L / ALT: 24 U/L / AST: 21 U/L / GGT: x                                                 Radiology:

## 2021-02-06 LAB
ALBUMIN SERPL ELPH-MCNC: 2.1 G/DL — LOW (ref 3.3–5)
ALP SERPL-CCNC: 82 U/L — SIGNIFICANT CHANGE UP (ref 40–120)
ALT FLD-CCNC: 21 U/L — SIGNIFICANT CHANGE UP (ref 12–78)
AST SERPL-CCNC: 21 U/L — SIGNIFICANT CHANGE UP (ref 15–37)
BILIRUB DIRECT SERPL-MCNC: 0.17 MG/DL — SIGNIFICANT CHANGE UP (ref 0.05–0.2)
BILIRUB INDIRECT FLD-MCNC: 0.3 MG/DL — SIGNIFICANT CHANGE UP (ref 0.2–1)
BILIRUB SERPL-MCNC: 0.5 MG/DL — SIGNIFICANT CHANGE UP (ref 0.2–1.2)
CREAT SERPL-MCNC: 0.69 MG/DL — SIGNIFICANT CHANGE UP (ref 0.5–1.3)
PROT SERPL-MCNC: 5.9 GM/DL — LOW (ref 6–8.3)

## 2021-02-06 RX ADMIN — ENOXAPARIN SODIUM 70 MILLIGRAM(S): 100 INJECTION SUBCUTANEOUS at 17:00

## 2021-02-06 RX ADMIN — Medication 500 MILLIGRAM(S): at 16:59

## 2021-02-06 RX ADMIN — Medication 100 MILLIGRAM(S): at 15:30

## 2021-02-06 RX ADMIN — Medication 500 MILLIGRAM(S): at 04:24

## 2021-02-06 RX ADMIN — ENOXAPARIN SODIUM 70 MILLIGRAM(S): 100 INJECTION SUBCUTANEOUS at 04:24

## 2021-02-06 RX ADMIN — PANTOPRAZOLE SODIUM 40 MILLIGRAM(S): 20 TABLET, DELAYED RELEASE ORAL at 04:24

## 2021-02-06 RX ADMIN — ZINC SULFATE TAB 220 MG (50 MG ZINC EQUIVALENT) 220 MILLIGRAM(S): 220 (50 ZN) TAB at 11:58

## 2021-02-07 LAB
ALBUMIN SERPL ELPH-MCNC: 2 G/DL — LOW (ref 3.3–5)
ALP SERPL-CCNC: 74 U/L — SIGNIFICANT CHANGE UP (ref 40–120)
ALT FLD-CCNC: 25 U/L — SIGNIFICANT CHANGE UP (ref 12–78)
AST SERPL-CCNC: 23 U/L — SIGNIFICANT CHANGE UP (ref 15–37)
BILIRUB DIRECT SERPL-MCNC: 0.11 MG/DL — SIGNIFICANT CHANGE UP (ref 0.05–0.2)
BILIRUB INDIRECT FLD-MCNC: 0.4 MG/DL — SIGNIFICANT CHANGE UP (ref 0.2–1)
BILIRUB SERPL-MCNC: 0.5 MG/DL — SIGNIFICANT CHANGE UP (ref 0.2–1.2)
CREAT SERPL-MCNC: 0.72 MG/DL — SIGNIFICANT CHANGE UP (ref 0.5–1.3)
PROT SERPL-MCNC: 5.7 GM/DL — LOW (ref 6–8.3)

## 2021-02-07 RX ADMIN — PANTOPRAZOLE SODIUM 40 MILLIGRAM(S): 20 TABLET, DELAYED RELEASE ORAL at 05:52

## 2021-02-07 RX ADMIN — ZINC SULFATE TAB 220 MG (50 MG ZINC EQUIVALENT) 220 MILLIGRAM(S): 220 (50 ZN) TAB at 11:05

## 2021-02-07 RX ADMIN — Medication 500 MILLIGRAM(S): at 05:52

## 2021-02-07 RX ADMIN — Medication 100 MILLIGRAM(S): at 17:19

## 2021-02-07 RX ADMIN — ENOXAPARIN SODIUM 70 MILLIGRAM(S): 100 INJECTION SUBCUTANEOUS at 05:52

## 2021-02-07 RX ADMIN — ENOXAPARIN SODIUM 70 MILLIGRAM(S): 100 INJECTION SUBCUTANEOUS at 17:18

## 2021-02-07 RX ADMIN — Medication 500 MILLIGRAM(S): at 17:19

## 2021-02-07 RX ADMIN — Medication 100 MILLIGRAM(S): at 05:52

## 2021-02-07 NOTE — PROGRESS NOTE ADULT - SUBJECTIVE AND OBJECTIVE BOX
Patient is a 80y old  Female who presents with a chief complaint of sob (05 Feb 2021 15:10)      INTERVAL HPI/OVERNIGHT EVENTS:  pt is doing better  MEDICATIONS  (STANDING):  amLODIPine   Tablet 5 milliGRAM(s) Oral daily  ascorbic acid 500 milliGRAM(s) Oral two times a day  doxycycline hyclate Capsule 100 milliGRAM(s) Oral every 12 hours  enoxaparin Injectable 70 milliGRAM(s) SubCutaneous two times a day  influenza   Vaccine 0.5 milliLiter(s) IntraMuscular once  pantoprazole    Tablet 40 milliGRAM(s) Oral before breakfast  zinc sulfate 220 milliGRAM(s) Oral daily    MEDICATIONS  (PRN):  acetaminophen   Tablet .. 650 milliGRAM(s) Oral every 4 hours PRN Temp greater or equal to 38.5C (101.3F)  ALBUTerol    90 MICROgram(s) HFA Inhaler 2 Puff(s) Inhalation every 4 hours PRN Shortness of Breath and/or Wheezing  benzonatate 100 milliGRAM(s) Oral three times a day PRN Cough  calcium carbonate    500 mG (Tums) Chewable 1 Tablet(s) Chew three times a day PRN Heartburn      Allergies    No Known Allergies    Intolerances        REVIEW OF SYSTEMS:  CONSTITUTIONAL: No fever, weight loss, or fatigue  EYES: No eye pain, visual disturbances, or discharge  ENMT:  No difficulty hearing, tinnitus, vertigo; No sinus or throat pain  NECK: No pain or stiffness  BREASTS: No pain, masses, or nipple discharge  RESPIRATORY: No cough, wheezing, chills or hemoptysis; No shortness of breath  CARDIOVASCULAR: No chest pain, palpitations, dizziness, or leg swelling  GASTROINTESTINAL: No abdominal or epigastric pain. No nausea, vomiting, or hematemesis; No diarrhea or constipation. No melena or hematochezia.  GENITOURINARY: No dysuria, frequency, hematuria, or incontinence  NEUROLOGICAL: No headaches, memory loss, loss of strength, numbness, or tremors  SKIN: No itching, burning, rashes, or lesions   LYMPH NODES: No enlarged glands  ENDOCRINE: No heat or cold intolerance; No hair loss  MUSCULOSKELETAL: No joint pain or swelling; No muscle, back, or extremity pain  PSYCHIATRIC: No depression, anxiety, mood swings, or difficulty sleeping  HEME/LYMPH: No easy bruising, or bleeding gums  ALLERGY AND IMMUNOLOGIC: No hives or eczema    Vital Signs Last 24 Hrs  T(C): 36.9 (07 Feb 2021 10:44), Max: 36.9 (06 Feb 2021 16:20)  T(F): 98.5 (07 Feb 2021 10:44), Max: 98.5 (07 Feb 2021 10:44)  HR: 105 (07 Feb 2021 10:44) (75 - 105)  BP: 99/66 (07 Feb 2021 10:44) (99/66 - 120/77)  BP(mean): 91 (06 Feb 2021 16:20) (91 - 91)  RR: 17 (07 Feb 2021 12:00) (16 - 19)  SpO2: 97% (07 Feb 2021 12:00) (97% - 99%)    PHYSICAL EXAM:  GENERAL: NAD, well-groomed, well-developed  HEAD:  Atraumatic, Normocephalic  EYES: EOMI, PERRLA, conjunctiva and sclera clear  ENMT: No tonsillar erythema, exudates, or enlargement; Moist mucous membranes, Good dentition, No lesions  NECK: Supple, No JVD, Normal thyroid  NERVOUS SYSTEM:  Alert & Oriented X3, Good concentration; Motor Strength 5/5 B/L upper and lower extremities; DTRs 2+ intact and symmetric  CHEST/LUNG: Clear to percussion bilaterally; No rales, rhonchi, wheezing, or rubs  HEART: Regular rate and rhythm; No murmurs, rubs, or gallops  ABDOMEN: Soft, Nontender, Nondistended; Bowel sounds present  EXTREMITIES:  2+ Peripheral Pulses, No clubbing, cyanosis, or edema  LYMPH: No lymphadenopathy noted  SKIN: No rashes or lesions    LABS:    02-07    x   |  x   |  x   ----------------------------<  x   x    |  x   |  0.72      TPro  5.7<L>  /  Alb  2.0<L>  /  TBili  0.5  /  DBili  .11  /  AST  23  /  ALT  25  /  AlkPhos  74  02-07        CAPILLARY BLOOD GLUCOSE        CULTURES:    HEMOGLOBIN A1C:    CHOLESTEROL:        RADIOLOGY & ADDITIONAL TESTS:

## 2021-02-07 NOTE — PROGRESS NOTE ADULT - SUBJECTIVE AND OBJECTIVE BOX
INTERVAL HPI:  80F Akan speaking  HTN and S/P lumbar Laminectomy,  c/o fever and SOB  for few days .    Tested COVID-19 (+) 6 days ago, started having shortness of breath few days ago with worsening dyspnea on exertion . Also reports increasing weakness.  In ED with temperature of 101, SPO2 90% on room air and CXT with bilateral infiltrates.  Non smoker.    OVERNIGHT EVENTS:  Awake, responsive    Vital Signs Last 24 Hrs  T(C): 36.9 (07 Feb 2021 10:44), Max: 36.9 (06 Feb 2021 16:20)  T(F): 98.5 (07 Feb 2021 10:44), Max: 98.5 (07 Feb 2021 10:44)  HR: 105 (07 Feb 2021 10:44) (75 - 105)  BP: 99/66 (07 Feb 2021 10:44) (99/66 - 120/77)  BP(mean): 91 (06 Feb 2021 16:20) (91 - 91)  RR: 17 (07 Feb 2021 12:00) (16 - 19)  SpO2: 97% (07 Feb 2021 12:00) (97% - 99%)    PHYSICAL EXAM:  GEN:         Awake, responsive and comfortable.  HEENT:    Normal.    RESP:       no distress  CVS:          Regular rate and rhythm.   ABD:         Soft, non-tender, non-distended;     MEDICATIONS  (STANDING):  amLODIPine   Tablet 5 milliGRAM(s) Oral daily  ascorbic acid 500 milliGRAM(s) Oral two times a day  doxycycline hyclate Capsule 100 milliGRAM(s) Oral every 12 hours  enoxaparin Injectable 70 milliGRAM(s) SubCutaneous two times a day  influenza   Vaccine 0.5 milliLiter(s) IntraMuscular once  pantoprazole    Tablet 40 milliGRAM(s) Oral before breakfast  zinc sulfate 220 milliGRAM(s) Oral daily    MEDICATIONS  (PRN):  acetaminophen   Tablet .. 650 milliGRAM(s) Oral every 4 hours PRN Temp greater or equal to 38.5C (101.3F)  ALBUTerol    90 MICROgram(s) HFA Inhaler 2 Puff(s) Inhalation every 4 hours PRN Shortness of Breath and/or Wheezing  benzonatate 100 milliGRAM(s) Oral three times a day PRN Cough  calcium carbonate    500 mG (Tums) Chewable 1 Tablet(s) Chew three times a day PRN Heartburn    LABS:    02-07    x   |  x   |  x   ----------------------------<  x   x    |  x   |  0.72    TPro  5.7<L>  /  Alb  2.0<L>  /  TBili  0.5  /  DBili  .11  /  AST  23  /  ALT  25  /  AlkPhos  74  02-07  ASSESSMENT AND PLAN:  ·	SOB.  ·	Hypoxia.  ·	COVID pneumonia.  ·	HTN    SPO2 96% on 2 litres.  Completed Remdesivir and Dexamethasone.  On full dose Lovenox.  Discharge planning.

## 2021-02-07 NOTE — PROGRESS NOTE ADULT - SUBJECTIVE AND OBJECTIVE BOX
HPI:  80F Akan speaking  PMHX HTN c/o fever for few days and get  tested COVID-19 (+) 6 days ago but the past few days she is having shortness of breath as well  as worsening dyspnea on exertion now presenting with hypoxia 90% on RA. Pt stated she is feeling tired and weak unable to ambulate. (24 Jan 2021 22:09)  treated  sp dexa and remdesivir   all events noted   now on 4 litres    Allergies    No Known Allergies    Intolerances        MEDICATIONS  (STANDING):  amLODIPine   Tablet 5 milliGRAM(s) Oral daily  ascorbic acid 500 milliGRAM(s) Oral two times a day  doxycycline hyclate Capsule 100 milliGRAM(s) Oral every 12 hours  enoxaparin Injectable 70 milliGRAM(s) SubCutaneous two times a day  influenza   Vaccine 0.5 milliLiter(s) IntraMuscular once  pantoprazole    Tablet 40 milliGRAM(s) Oral before breakfast  zinc sulfate 220 milliGRAM(s) Oral daily    MEDICATIONS  (PRN):  acetaminophen   Tablet .. 650 milliGRAM(s) Oral every 4 hours PRN Temp greater or equal to 38.5C (101.3F)  ALBUTerol    90 MICROgram(s) HFA Inhaler 2 Puff(s) Inhalation every 4 hours PRN Shortness of Breath and/or Wheezing  benzonatate 100 milliGRAM(s) Oral three times a day PRN Cough  calcium carbonate    500 mG (Tums) Chewable 1 Tablet(s) Chew three times a day PRN Heartburn      REVIEW OF SYSTEMS:    short of breath   cough better    VITAL SIGNS:  T(C): 36.9 (02-07-21 @ 10:44), Max: 36.9 (02-06-21 @ 16:20)  T(F): 98.5 (02-07-21 @ 10:44), Max: 98.5 (02-07-21 @ 10:44)  HR: 105 (02-07-21 @ 10:44) (75 - 105)  BP: 99/66 (02-07-21 @ 10:44) (99/66 - 120/77)  RR: 17 (02-07-21 @ 12:00) (16 - 19)  SpO2: 97% (02-07-21 @ 12:00) (97% - 99%)  Wt(kg): --    PHYSICAL EXAM:    GENERAL: not in any distress  HEENT: Neck is supple, normocephalic, atraumatic   CHEST/LUNG: Clear to auscultation bilaterally; No rales, rhonchi, wheezing  HEART: Regular rate and rhythm; No murmurs, rubs, or gallops  ABDOMEN: Soft, Nontender, Nondistended; Bowel sounds present, no rebound   EXTREMITIES:  2+ Peripheral Pulses, No clubbing, cyanosis, or edema  SKIN: No rashes or lesions  BACK: no pressor sore   NERVOUS SYSTEM:  Alert & Oriented X3,LABS:     02-07    x   |  x   |  x   ----------------------------<  x   x    |  x   |  0.72      TPro  5.7<L>  /  Alb  2.0<L>  /  TBili  0.5  /  DBili  .11  /  AST  23  /  ALT  25  /  AlkPhos  74  02-07    LIVER FUNCTIONS - ( 07 Feb 2021 07:14 )  Alb: 2.0 g/dL / Pro: 5.7 gm/dL / ALK PHOS: 74 U/L / ALT: 25 U/L / AST: 23 U/L / GGT: x                                                 Radiology:      < from: Xray Chest 1 View- PORTABLE-Routine (Xray Chest 1 View- PORTABLE-Routine .) (02.04.21 @ 14:25) >  Slight clearing of the lungs.    Thank you for the courtesy of this referral.            VARUN SWANN MD; Attending Interventional Radiologist  This document has been electronically signed. Feb 4 2021  5:52PM    < end of copied text >

## 2021-02-08 LAB
ALBUMIN SERPL ELPH-MCNC: 2.1 G/DL — LOW (ref 3.3–5)
ALP SERPL-CCNC: 77 U/L — SIGNIFICANT CHANGE UP (ref 40–120)
ALT FLD-CCNC: 33 U/L — SIGNIFICANT CHANGE UP (ref 12–78)
ANION GAP SERPL CALC-SCNC: 4 MMOL/L — LOW (ref 5–17)
AST SERPL-CCNC: 30 U/L — SIGNIFICANT CHANGE UP (ref 15–37)
BILIRUB SERPL-MCNC: 0.4 MG/DL — SIGNIFICANT CHANGE UP (ref 0.2–1.2)
BUN SERPL-MCNC: 8 MG/DL — SIGNIFICANT CHANGE UP (ref 7–23)
CALCIUM SERPL-MCNC: 7.7 MG/DL — LOW (ref 8.5–10.1)
CHLORIDE SERPL-SCNC: 106 MMOL/L — SIGNIFICANT CHANGE UP (ref 96–108)
CO2 SERPL-SCNC: 28 MMOL/L — SIGNIFICANT CHANGE UP (ref 22–31)
CREAT SERPL-MCNC: 0.72 MG/DL — SIGNIFICANT CHANGE UP (ref 0.5–1.3)
GLUCOSE SERPL-MCNC: 92 MG/DL — SIGNIFICANT CHANGE UP (ref 70–99)
HCT VFR BLD CALC: 40.6 % — SIGNIFICANT CHANGE UP (ref 34.5–45)
HGB BLD-MCNC: 13.5 G/DL — SIGNIFICANT CHANGE UP (ref 11.5–15.5)
MCHC RBC-ENTMCNC: 29.9 PG — SIGNIFICANT CHANGE UP (ref 27–34)
MCHC RBC-ENTMCNC: 33.3 GM/DL — SIGNIFICANT CHANGE UP (ref 32–36)
MCV RBC AUTO: 90 FL — SIGNIFICANT CHANGE UP (ref 80–100)
NRBC # BLD: 0 /100 WBCS — SIGNIFICANT CHANGE UP (ref 0–0)
PLATELET # BLD AUTO: 173 K/UL — SIGNIFICANT CHANGE UP (ref 150–400)
POTASSIUM SERPL-MCNC: 3.1 MMOL/L — LOW (ref 3.5–5.3)
POTASSIUM SERPL-SCNC: 3.1 MMOL/L — LOW (ref 3.5–5.3)
PROT SERPL-MCNC: 6.3 GM/DL — SIGNIFICANT CHANGE UP (ref 6–8.3)
RBC # BLD: 4.51 M/UL — SIGNIFICANT CHANGE UP (ref 3.8–5.2)
RBC # FLD: 14.6 % — HIGH (ref 10.3–14.5)
SODIUM SERPL-SCNC: 138 MMOL/L — SIGNIFICANT CHANGE UP (ref 135–145)
WBC # BLD: 5.12 K/UL — SIGNIFICANT CHANGE UP (ref 3.8–10.5)
WBC # FLD AUTO: 5.12 K/UL — SIGNIFICANT CHANGE UP (ref 3.8–10.5)

## 2021-02-08 RX ORDER — POTASSIUM CHLORIDE 20 MEQ
40 PACKET (EA) ORAL ONCE
Refills: 0 | Status: COMPLETED | OUTPATIENT
Start: 2021-02-08 | End: 2021-02-08

## 2021-02-08 RX ADMIN — Medication 40 MILLIEQUIVALENT(S): at 18:25

## 2021-02-08 RX ADMIN — Medication 500 MILLIGRAM(S): at 05:39

## 2021-02-08 RX ADMIN — Medication 100 MILLIGRAM(S): at 17:15

## 2021-02-08 RX ADMIN — PANTOPRAZOLE SODIUM 40 MILLIGRAM(S): 20 TABLET, DELAYED RELEASE ORAL at 05:39

## 2021-02-08 RX ADMIN — ENOXAPARIN SODIUM 70 MILLIGRAM(S): 100 INJECTION SUBCUTANEOUS at 17:15

## 2021-02-08 RX ADMIN — Medication 500 MILLIGRAM(S): at 17:14

## 2021-02-08 RX ADMIN — ZINC SULFATE TAB 220 MG (50 MG ZINC EQUIVALENT) 220 MILLIGRAM(S): 220 (50 ZN) TAB at 11:29

## 2021-02-08 RX ADMIN — ENOXAPARIN SODIUM 70 MILLIGRAM(S): 100 INJECTION SUBCUTANEOUS at 05:39

## 2021-02-08 RX ADMIN — Medication 100 MILLIGRAM(S): at 05:39

## 2021-02-08 NOTE — PROGRESS NOTE ADULT - SUBJECTIVE AND OBJECTIVE BOX
Patient is a 80y old  Female who presents with a chief complaint of sob (07 Feb 2021 14:30)      INTERVAL HPI/OVERNIGHT EVENTS:  pt is feeling better  MEDICATIONS  (STANDING):  amLODIPine   Tablet 5 milliGRAM(s) Oral daily  ascorbic acid 500 milliGRAM(s) Oral two times a day  doxycycline hyclate Capsule 100 milliGRAM(s) Oral every 12 hours  enoxaparin Injectable 70 milliGRAM(s) SubCutaneous two times a day  influenza   Vaccine 0.5 milliLiter(s) IntraMuscular once  pantoprazole    Tablet 40 milliGRAM(s) Oral before breakfast  zinc sulfate 220 milliGRAM(s) Oral daily    MEDICATIONS  (PRN):  acetaminophen   Tablet .. 650 milliGRAM(s) Oral every 4 hours PRN Temp greater or equal to 38.5C (101.3F)  ALBUTerol    90 MICROgram(s) HFA Inhaler 2 Puff(s) Inhalation every 4 hours PRN Shortness of Breath and/or Wheezing  benzonatate 100 milliGRAM(s) Oral three times a day PRN Cough  calcium carbonate    500 mG (Tums) Chewable 1 Tablet(s) Chew three times a day PRN Heartburn      Allergies    No Known Allergies    Intolerances        REVIEW OF SYSTEMS:  CONSTITUTIONAL: No fever, weight loss, or fatigue  EYES: No eye pain, visual disturbances, or discharge  ENMT:  No difficulty hearing, tinnitus, vertigo; No sinus or throat pain  NECK: No pain or stiffness  BREASTS: No pain, masses, or nipple discharge  RESPIRATORY: No cough, wheezing, chills or hemoptysis; No shortness of breath  CARDIOVASCULAR: No chest pain, palpitations, dizziness, or leg swelling  GASTROINTESTINAL: No abdominal or epigastric pain. No nausea, vomiting, or hematemesis; No diarrhea or constipation. No melena or hematochezia.  GENITOURINARY: No dysuria, frequency, hematuria, or incontinence  NEUROLOGICAL: No headaches, memory loss, loss of strength, numbness, or tremors  SKIN: No itching, burning, rashes, or lesions   LYMPH NODES: No enlarged glands  ENDOCRINE: No heat or cold intolerance; No hair loss  MUSCULOSKELETAL: No joint pain or swelling; No muscle, back, or extremity pain  PSYCHIATRIC: No depression, anxiety, mood swings, or difficulty sleeping  HEME/LYMPH: No easy bruising, or bleeding gums  ALLERGY AND IMMUNOLOGIC: No hives or eczema    Vital Signs Last 24 Hrs  T(C): 36.7 (08 Feb 2021 11:22), Max: 36.8 (07 Feb 2021 17:08)  T(F): 98.1 (08 Feb 2021 11:22), Max: 98.3 (07 Feb 2021 17:08)  HR: 90 (08 Feb 2021 11:22) (71 - 90)  BP: 113/68 (08 Feb 2021 11:22) (98/62 - 117/72)  BP(mean): --  RR: 18 (08 Feb 2021 11:22) (17 - 18)  SpO2: 96% (08 Feb 2021 11:22) (92% - 98%)    PHYSICAL EXAM:  GENERAL: NAD, well-groomed, well-developed  HEAD:  Atraumatic, Normocephalic  EYES: EOMI, PERRLA, conjunctiva and sclera clear  ENMT: No tonsillar erythema, exudates, or enlargement; Moist mucous membranes, Good dentition, No lesions  NECK: Supple, No JVD, Normal thyroid  NERVOUS SYSTEM:  Alert & Oriented X3, Good concentration; Motor Strength 5/5 B/L upper and lower extremities; DTRs 2+ intact and symmetric  CHEST/LUNG: Clear to percussion bilaterally; No rales, rhonchi, wheezing, or rubs  HEART: Regular rate and rhythm; No murmurs, rubs, or gallops  ABDOMEN: Soft, Nontender, Nondistended; Bowel sounds present  EXTREMITIES:  2+ Peripheral Pulses, No clubbing, cyanosis, or edema  LYMPH: No lymphadenopathy noted  SKIN: No rashes or lesions    LABS:    02-07    x   |  x   |  x   ----------------------------<  x   x    |  x   |  0.72      TPro  5.7<L>  /  Alb  2.0<L>  /  TBili  0.5  /  DBili  .11  /  AST  23  /  ALT  25  /  AlkPhos  74  02-07        CAPILLARY BLOOD GLUCOSE        CULTURES:    HEMOGLOBIN A1C:    CHOLESTEROL:        RADIOLOGY & ADDITIONAL TESTS:

## 2021-02-08 NOTE — CHART NOTE - NSCHARTNOTEFT_GEN_A_CORE
Pt w/ R/O pneumonia due to COVID - 19 virus ; Hypoxia ; Respiratory distress ; Essential HTN    Factors impacting intake: [x ] none [ ] nausea  [ ] vomiting [ ] diarrhea [ ] constipation  [ ]chewing problems [ ] swallowing issues  [ ] other:     1/24 - Diet Prescription: Diet, Regular (01-24-21 @ 22:06)    Intake: 50 - 100%.     Current Weight: 2/8 - 172.6 (78.3 kg) no edema noted ; 1/31 -169.5 (76.9 kg) no edema noted  % Weight Change - 1.7 % / 1.4 kg gain x 8 days.    Physical Appearance - No edema noted  In Accordance with current standards limiting physical contact-unable to perform physical Assessment at this time.       Pertinent Medications: MEDICATIONS  (STANDING):  amLODIPine   Tablet 5 milliGRAM(s) Oral daily  ascorbic acid 500 milliGRAM(s) Oral two times a day  doxycycline hyclate Capsule 100 milliGRAM(s) Oral every 12 hours  enoxaparin Injectable 70 milliGRAM(s) SubCutaneous two times a day  influenza   Vaccine 0.5 milliLiter(s) IntraMuscular once  pantoprazole    Tablet 40 milliGRAM(s) Oral before breakfast  zinc sulfate 220 milliGRAM(s) Oral daily    MEDICATIONS  (PRN):  acetaminophen   Tablet .. 650 milliGRAM(s) Oral every 4 hours PRN Temp greater or equal to 38.5C (101.3F)  ALBUTerol    90 MICROgram(s) HFA Inhaler 2 Puff(s) Inhalation every 4 hours PRN Shortness of Breath and/or Wheezing  benzonatate 100 milliGRAM(s) Oral three times a day PRN Cough  calcium carbonate    500 mG (Tums) Chewable 1 Tablet(s) Chew three times a day PRN Heartburn    Pertinent Labs: 02-08 Na138 mmol/L Glu 92 mg/dL K+ 3.1 mmol/L<L> Cr  0.72 mg/dL BUN 8 mg/dL 02-08 Alb 2.1 g/dL<L>02-08 ALT 33 U/L AST 30 U/L Alkaline Phosphatase 77 U/L     CAPILLARY BLOOD GLUCOSE        Skin: WDL    Estimated Needs:   [x ] no change since previous assessment (1/31/21)  [ ] recalculated:       Nutrition Diagnosis:    Nutrition Diagnosis:  Nutrition diagnosis no... No active nutrition diagnosis identified at this time. Patient meeting estimated nutrient needs.      Nutrition Diagnosis is [x ] ongoing  [ ] resolved [ ] not applicable     New Nutrition Diagnosis: [x ] not applicable       Interventions:   Recommend  [ x ] Contine Regular diet as Rx'd  [ ] Change Diet To:  [ ] Nutrition Supplement  [ ] Nutrition Support  [ ] Other:     Monitoring and Evaluation:   [x ] PO intake [ x ] Tolerance to diet prescription [ x ] weights [ x ] labs[ x ] follow up per protocol  [ ] other:

## 2021-02-08 NOTE — DISCHARGE NOTE PROVIDER - NSDCMRMEDTOKEN_GEN_ALL_CORE_FT
amLODIPine 5 mg oral tablet: 1 tab(s) orally once a day   amLODIPine 5 mg oral tablet: 1 tab(s) orally once a day  ascorbic acid 500 mg oral tablet: 1 tab(s) orally 2 times a day  benzonatate 100 mg oral capsule: 1 cap(s) orally 3 times a day, As needed, Cough  pantoprazole 40 mg oral delayed release tablet: 1 tab(s) orally once a day (before a meal)  Xarelto 20 mg oral tablet: 1 tab(s) orally once a day   zinc sulfate 220 mg oral capsule: 1 cap(s) orally once a day

## 2021-02-08 NOTE — PROGRESS NOTE ADULT - PROBLEM SELECTOR PLAN 3
cont high flow  70% O2
cont Venti Mask, will reduce to 50% O2
cont high flow  70% O2
pulmonary consult noted and appreciated
cont NON rebreather
pulmonary consult noted and appreciated
cont Venti Mask, will reduce to 50% O2
cont Venti Mask, will reduce to 50% O2
pulmonary consult called
cont Venti Mask
cont high flow  70% O2
resolving on NC 2L
cont NON rebreather
cont high flow  70% O2

## 2021-02-08 NOTE — DISCHARGE NOTE PROVIDER - NSDCCPCAREPLAN_GEN_ALL_CORE_FT
PRINCIPAL DISCHARGE DIAGNOSIS  Diagnosis: COVID-19  Assessment and Plan of Treatment: 80F Elana speaking with a PMHX HTN c/o fever for few days and get  tested COVID-19 (+) 6 days ago but the past few days she is having shortness of breath as well  as worsening dyspnea on exertion now presenting with hypoxia 90% on RA. Pt stated she is feeling tired and weak unable to ambulate. Treated with Dexamethasone and Remdesivir. Slowly titrated off non-rebreather now doing well on 1L NC. Feeling better. D/c planning for AM. Continue home meds and follow up with PMD.         SECONDARY DISCHARGE DIAGNOSES  Diagnosis: Hypoxia  Assessment and Plan of Treatment:

## 2021-02-08 NOTE — DISCHARGE NOTE PROVIDER - HOSPITAL COURSE
80F Elana speaking with a PMHX HTN c/o fever for few days and get  tested COVID-19 (+) 6 days ago but the past few days she is having shortness of breath as well  as worsening dyspnea on exertion now presenting with hypoxia 90% on RA. Pt stated she is feeling tired and weak unable to ambulate. Treated with Dexamethasone and Remdesivir. Slowly titrated off non-rebreather now doing well on 1L NC. Feeling better. D/c planning for AM. Continue home meds and follow up with PMD.

## 2021-02-08 NOTE — PROGRESS NOTE ADULT - PROBLEM SELECTOR PLAN 1
cont med
cont tx, ID consult
ID consult noted
ID f/u
cont med
cont med, dc planning in AM
ID f/u
cont med
cont med
ID f/u
cont med
ID consult noted

## 2021-02-08 NOTE — PROGRESS NOTE ADULT - PROBLEM SELECTOR PLAN 2
cont O2
cont O2, agree
cont O2, agree
cont O2,
cont O2
cont O2, agree
cont O2, agree to decrease to 50%
cont O2, agree
cont O2, agree to decrease to 50%

## 2021-02-08 NOTE — PROGRESS NOTE ADULT - PROBLEM SELECTOR PROBLEM 2
Hypoxia
Essential hypertension
Hypoxia
Essential hypertension
Hypoxia
Essential hypertension
Essential hypertension
Hypoxia

## 2021-02-08 NOTE — PROGRESS NOTE ADULT - PROBLEM SELECTOR PROBLEM 3
Hypoxia
Respiratory distress
Hypoxia
Respiratory distress
Hypoxia
Hypoxia
Respiratory distress

## 2021-02-08 NOTE — PROGRESS NOTE ADULT - PROBLEM SELECTOR PROBLEM 4
Essential hypertension

## 2021-02-08 NOTE — PROGRESS NOTE ADULT - PROBLEM SELECTOR PLAN 4
cont med

## 2021-02-09 VITALS
RESPIRATION RATE: 18 BRPM | HEART RATE: 88 BPM | SYSTOLIC BLOOD PRESSURE: 124 MMHG | TEMPERATURE: 98 F | OXYGEN SATURATION: 93 % | DIASTOLIC BLOOD PRESSURE: 80 MMHG

## 2021-02-09 LAB
ANION GAP SERPL CALC-SCNC: 6 MMOL/L — SIGNIFICANT CHANGE UP (ref 5–17)
BUN SERPL-MCNC: 7 MG/DL — SIGNIFICANT CHANGE UP (ref 7–23)
CALCIUM SERPL-MCNC: 7.8 MG/DL — LOW (ref 8.5–10.1)
CHLORIDE SERPL-SCNC: 108 MMOL/L — SIGNIFICANT CHANGE UP (ref 96–108)
CO2 SERPL-SCNC: 25 MMOL/L — SIGNIFICANT CHANGE UP (ref 22–31)
CREAT SERPL-MCNC: 0.68 MG/DL — SIGNIFICANT CHANGE UP (ref 0.5–1.3)
GLUCOSE SERPL-MCNC: 102 MG/DL — HIGH (ref 70–99)
HCT VFR BLD CALC: 38.9 % — SIGNIFICANT CHANGE UP (ref 34.5–45)
HGB BLD-MCNC: 12.6 G/DL — SIGNIFICANT CHANGE UP (ref 11.5–15.5)
MCHC RBC-ENTMCNC: 29.6 PG — SIGNIFICANT CHANGE UP (ref 27–34)
MCHC RBC-ENTMCNC: 32.4 GM/DL — SIGNIFICANT CHANGE UP (ref 32–36)
MCV RBC AUTO: 91.3 FL — SIGNIFICANT CHANGE UP (ref 80–100)
NRBC # BLD: 0 /100 WBCS — SIGNIFICANT CHANGE UP (ref 0–0)
PLATELET # BLD AUTO: 156 K/UL — SIGNIFICANT CHANGE UP (ref 150–400)
POTASSIUM SERPL-MCNC: 3.7 MMOL/L — SIGNIFICANT CHANGE UP (ref 3.5–5.3)
POTASSIUM SERPL-SCNC: 3.7 MMOL/L — SIGNIFICANT CHANGE UP (ref 3.5–5.3)
RBC # BLD: 4.26 M/UL — SIGNIFICANT CHANGE UP (ref 3.8–5.2)
RBC # FLD: 14.9 % — HIGH (ref 10.3–14.5)
SARS-COV-2 IGG SERPL IA-ACNC: 5.2 RATIO — HIGH
SARS-COV-2 IGG SERPL QL IA: POSITIVE
SARS-COV-2 IGG SERPL QL IA: POSITIVE
SARS-COV-2 IGM SERPL IA-ACNC: 8.23 RATIO — HIGH
SODIUM SERPL-SCNC: 139 MMOL/L — SIGNIFICANT CHANGE UP (ref 135–145)
WBC # BLD: 4.58 K/UL — SIGNIFICANT CHANGE UP (ref 3.8–10.5)
WBC # FLD AUTO: 4.58 K/UL — SIGNIFICANT CHANGE UP (ref 3.8–10.5)

## 2021-02-09 RX ORDER — ZINC SULFATE TAB 220 MG (50 MG ZINC EQUIVALENT) 220 (50 ZN) MG
1 TAB ORAL
Qty: 7 | Refills: 0
Start: 2021-02-09 | End: 2021-02-15

## 2021-02-09 RX ORDER — ASCORBIC ACID 60 MG
1 TABLET,CHEWABLE ORAL
Qty: 28 | Refills: 0
Start: 2021-02-09 | End: 2021-02-22

## 2021-02-09 RX ORDER — RIVAROXABAN 15 MG-20MG
1 KIT ORAL
Qty: 30 | Refills: 0
Start: 2021-02-09 | End: 2021-03-10

## 2021-02-09 RX ORDER — AMLODIPINE BESYLATE 2.5 MG/1
1 TABLET ORAL
Qty: 30 | Refills: 0
Start: 2021-02-09 | End: 2021-03-10

## 2021-02-09 RX ORDER — PANTOPRAZOLE SODIUM 20 MG/1
1 TABLET, DELAYED RELEASE ORAL
Qty: 30 | Refills: 0
Start: 2021-02-09 | End: 2021-03-10

## 2021-02-09 RX ORDER — ENOXAPARIN SODIUM 100 MG/ML
40 INJECTION SUBCUTANEOUS DAILY
Refills: 0 | Status: DISCONTINUED | OUTPATIENT
Start: 2021-02-10 | End: 2021-02-09

## 2021-02-09 RX ORDER — AMLODIPINE BESYLATE 2.5 MG/1
1 TABLET ORAL
Qty: 0 | Refills: 0 | DISCHARGE

## 2021-02-09 RX ADMIN — Medication 500 MILLIGRAM(S): at 05:13

## 2021-02-09 RX ADMIN — Medication 100 MILLIGRAM(S): at 17:47

## 2021-02-09 RX ADMIN — ENOXAPARIN SODIUM 70 MILLIGRAM(S): 100 INJECTION SUBCUTANEOUS at 05:13

## 2021-02-09 RX ADMIN — Medication 100 MILLIGRAM(S): at 05:13

## 2021-02-09 RX ADMIN — ZINC SULFATE TAB 220 MG (50 MG ZINC EQUIVALENT) 220 MILLIGRAM(S): 220 (50 ZN) TAB at 17:47

## 2021-02-09 RX ADMIN — PANTOPRAZOLE SODIUM 40 MILLIGRAM(S): 20 TABLET, DELAYED RELEASE ORAL at 05:14

## 2021-02-09 RX ADMIN — Medication 500 MILLIGRAM(S): at 17:48

## 2021-02-09 NOTE — PROGRESS NOTE ADULT - REASON FOR ADMISSION
sob

## 2021-02-09 NOTE — PROGRESS NOTE ADULT - PROVIDER SPECIALTY LIST ADULT
Infectious Disease
Pulmonology
Pulmonology
Infectious Disease
Infectious Disease
Pulmonology
Infectious Disease
Infectious Disease
Pulmonology
Infectious Disease
Internal Medicine
Infectious Disease
Internal Medicine
Infectious Disease
Internal Medicine

## 2021-02-09 NOTE — DISCHARGE NOTE NURSING/CASE MANAGEMENT/SOCIAL WORK - PATIENT PORTAL LINK FT
You can access the FollowMyHealth Patient Portal offered by [facility] by registering at the following website: [email or web address]. By joining [employer]’s FollowMyHealth portal, you will also be able to view your health information using other applications (apps) compatible with our system.

## 2021-02-09 NOTE — PROGRESS NOTE ADULT - ASSESSMENT
Severe Covid pneumonia   on high flow seen in this morning AA not in acute distress   s/p remdesevir completed 10 days   covid  serologies relatively low   s/p dexa  procalcitonin 0.03   repeated cxr slightly clear   prognosis guarded  patient is status quo several days   
    Severe Covid pneumonia   s/p high flow  on 2 L NC seen in this morning AA not in acute distress   s/p remdesevir completed 10 days   covid  serologies relatively low   s/p dexa  procalcitonin 0.03 not significant   repeated cxr slightly clear   monitor off abx 
Patient is being seen for COVID PNA/ Acute Hypoxic Resp failure  Remain on NRB  Tachypneic at the moment of evaluation  O2 saturation around: 72-86%    Trending down CRP: 5.4-->3  Ferritin: 446  LDH: 669  Normal procal    d-dimer: 600    CTA: suggestive of multifocal PNA, Negative for PE    -Will extend Remdesivir to complete a total of 10 days  -Continue IV steroids  Trend Inflammatory markers  Daily LFT  vit c and zinc  -Incentive spirometry encourage  -Recommend pronation as tolerated    At risk of rapid pulmonary deterioration, please closely monitor.
    Severe Covid pneumonia   on high flow seen in this morning AA not in acute distress   s/p remdesevir completed 10 days   covid  serologies relatively low   s/p dexa  procalcitonin 0.03   repeated cxr slightly clear   prognosis guarded  patient is status quo several days   
Patient is being seen for COVID PNA  On NRB  O2 saturation around: 92-94%    Trending down CRP: 5.4  Ferritin: 446  LDH: 669  Normal procal    d-dimer: 600    CTA: suggestive of multifocal PNA, Negative for PE    -Continue Remdesivir  -Continue IV steroids  Trend Inflammatory markers  Daily LFT  vit c and zinc  -Incentive spirometry encourage  -Recommend pronation as tolerated  
severe covid pneumonia   clinically worse  on remdesevir and dexa   recheck serologies   prognosis guarded  pulm follow up 
severe covid pneumonia   clinically worse  on remdesevir and dexa   recheck serologies   prognosis guarded  pulm follow up 
severe covid pneumonia   clinically worse  on remdesevir and dexa   covid  serologies relatively low   dexa dcd noted   prognosis guarded  patient is status quo several days

## 2021-02-09 NOTE — PROGRESS NOTE ADULT - SUBJECTIVE AND OBJECTIVE BOX
HPI:  80F Akan speaking  PMHX HTN c/o fever for few days and get  tested COVID-19 (+) 6 days ago but the past few days she is having shortness of breath as well  as worsening dyspnea on exertion now presenting with hypoxia 90% on RA. Pt stated she is feeling tired and weak unable to ambulate. (24 Jan 2021 22:09)      Allergies    No Known Allergies    Intolerances        MEDICATIONS  (STANDING):  amLODIPine   Tablet 5 milliGRAM(s) Oral daily  ascorbic acid 500 milliGRAM(s) Oral two times a day  doxycycline hyclate Capsule 100 milliGRAM(s) Oral every 12 hours  enoxaparin Injectable 70 milliGRAM(s) SubCutaneous two times a day  influenza   Vaccine 0.5 milliLiter(s) IntraMuscular once  pantoprazole    Tablet 40 milliGRAM(s) Oral before breakfast  zinc sulfate 220 milliGRAM(s) Oral daily    MEDICATIONS  (PRN):  acetaminophen   Tablet .. 650 milliGRAM(s) Oral every 4 hours PRN Temp greater or equal to 38.5C (101.3F)  ALBUTerol    90 MICROgram(s) HFA Inhaler 2 Puff(s) Inhalation every 4 hours PRN Shortness of Breath and/or Wheezing  benzonatate 100 milliGRAM(s) Oral three times a day PRN Cough  calcium carbonate    500 mG (Tums) Chewable 1 Tablet(s) Chew three times a day PRN Heartburn      REVIEW OF SYSTEMS:    CONSTITUTIONAL: No fever   RESPIRATORY: No cough, wheezing  CARDIOVASCULAR: No chest pain  GASTROINTESTINAL: No abdominal pain.  GENITOURINARY: No dysuria, increase frequency, hematuria, or incontinence  NEUROLOGICAL: No headaches, memory loss  SKIN: No itching, burning, rashes, or lesions     VITAL SIGNS:  T(C): 36.4 (02-09-21 @ 09:20), Max: 36.9 (02-08-21 @ 16:23)  T(F): 97.6 (02-09-21 @ 09:20), Max: 98.4 (02-08-21 @ 16:23)  HR: 70 (02-09-21 @ 09:20) (70 - 90)  BP: 116/70 (02-09-21 @ 09:20) (110/72 - 116/70)  RR: 19 (02-09-21 @ 09:20) (18 - 19)  SpO2: 96% (02-09-21 @ 09:20) (96% - 97%)  Wt(kg): --    PHYSICAL EXAM:    GENERAL: not in any distress  HEENT: Neck is supple, normocephalic, atraumatic   CHEST/LUNG: Clear to percussion bilaterally; No rales, rhonchi, wheezing  HEART: Regular rate and rhythm; No murmurs, rubs, or gallops  ABDOMEN: Soft, Nontender, Nondistended; Bowel sounds present, no rebound   EXTREMITIES:  2+ Peripheral Pulses, No clubbing, cyanosis, or edema  GENITOURINARY:   SKIN: No rashes or lesions  BACK: no pressor sore   NERVOUS SYSTEM:  Alert & Oriented      LABS:                         12.6   4.58  )-----------( 156      ( 09 Feb 2021 06:44 )             38.9     02-09    139  |  108  |  7   ----------------------------<  102<H>  3.7   |  25  |  0.68    Ca    7.8<L>      09 Feb 2021 06:44    TPro  6.3  /  Alb  2.1<L>  /  TBili  0.4  /  DBili  x   /  AST  30  /  ALT  33  /  AlkPhos  77  02-08    LIVER FUNCTIONS - ( 08 Feb 2021 12:52 )  Alb: 2.1 g/dL / Pro: 6.3 gm/dL / ALK PHOS: 77 U/L / ALT: 33 U/L / AST: 30 U/L / GGT: x                                                 Radiology:

## 2021-02-09 NOTE — PROGRESS NOTE ADULT - SUBJECTIVE AND OBJECTIVE BOX
INTERVAL HPI:  80F Akan speaking  HTN and S/P lumbar Laminectomy,  c/o fever and SOB  for few days .    Tested COVID-19 (+) 6 days ago, started having shortness of breath few days ago with worsening dyspnea on exertion . Also reports increasing weakness.  In ED with temperature of 101, SPO2 90% on room air and CXT with bilateral infiltrates.  Non smoker.    OVERNIGHT EVENTS:  SPO2 93% on room air.    Vital Signs Last 24 Hrs  T(C): 36.9 (09 Feb 2021 16:18), Max: 36.9 (08 Feb 2021 23:50)  T(F): 98.4 (09 Feb 2021 16:18), Max: 98.4 (08 Feb 2021 23:50)  HR: 88 (09 Feb 2021 16:18) (70 - 88)  BP: 124/80 (09 Feb 2021 16:18) (111/71 - 124/80)  BP(mean): --  RR: 18 (09 Feb 2021 16:18) (17 - 19)  SpO2: 93% (09 Feb 2021 16:18) (93% - 97%)    PHYSICAL EXAM:  GEN:         Awake, responsive and comfortable.  HEENT:    Normal.    RESP:        no distress  CVS:          Regular rate and rhythm.   ABD:         Soft, non-tender, non-distended;     MEDICATIONS  (STANDING):  amLODIPine   Tablet 5 milliGRAM(s) Oral daily  ascorbic acid 500 milliGRAM(s) Oral two times a day  doxycycline hyclate Capsule 100 milliGRAM(s) Oral every 12 hours  enoxaparin Injectable 70 milliGRAM(s) SubCutaneous two times a day  influenza   Vaccine 0.5 milliLiter(s) IntraMuscular once  pantoprazole    Tablet 40 milliGRAM(s) Oral before breakfast  zinc sulfate 220 milliGRAM(s) Oral daily    MEDICATIONS  (PRN):  acetaminophen   Tablet .. 650 milliGRAM(s) Oral every 4 hours PRN Temp greater or equal to 38.5C (101.3F)  ALBUTerol    90 MICROgram(s) HFA Inhaler 2 Puff(s) Inhalation every 4 hours PRN Shortness of Breath and/or Wheezing  benzonatate 100 milliGRAM(s) Oral three times a day PRN Cough  calcium carbonate    500 mG (Tums) Chewable 1 Tablet(s) Chew three times a day PRN Heartburn    LABS:                        12.6   4.58  )-----------( 156      ( 09 Feb 2021 06:44 )             38.9     02-09    139  |  108  |  7   ----------------------------<  102<H>  3.7   |  25  |  0.68    Ca    7.8<L>      09 Feb 2021 06:44    TPro  6.3  /  Alb  2.1<L>  /  TBili  0.4  /  DBili  x   /  AST  30  /  ALT  33  /  AlkPhos  77  02-08    ASSESSMENT AND PLAN:  ·	SOB.  ·	Hypoxia.  ·	COVID pneumonia.  ·	HTN    SPO2 93% on room air.  Completed Remdesivir and Dexamethasone.  Will change to prophylactic Lovenox.  DC planning.

## 2021-02-13 DIAGNOSIS — J96.01 ACUTE RESPIRATORY FAILURE WITH HYPOXIA: ICD-10-CM

## 2021-02-13 DIAGNOSIS — U07.1 COVID-19: ICD-10-CM

## 2021-02-13 DIAGNOSIS — I10 ESSENTIAL (PRIMARY) HYPERTENSION: ICD-10-CM

## 2021-02-13 DIAGNOSIS — J12.82 PNEUMONIA DUE TO CORONAVIRUS DISEASE 2019: ICD-10-CM

## 2025-03-06 NOTE — PROGRESS NOTE ADULT - SUBJECTIVE AND OBJECTIVE BOX
INTERVAL HPI:  80F Akan speaking  HTN and S/P lumbar Laminectomy,  c/o fever and SOB  for few days .    Tested COVID-19 (+) 6 days ago, started having shortness of breath few days ago with worsening dyspnea on exertion . Also reports increasing weakness.  In ED with temperature of 101, SPO2 90% on room air and CXT with bilateral infiltrates.  Non smoker.    OVERNIGHT EVENTS:  Awake responsive    Vital Signs Last 24 Hrs  T(C): 36.4 (28 Jan 2021 16:53), Max: 36.7 (28 Jan 2021 10:50)  T(F): 97.6 (28 Jan 2021 16:53), Max: 98 (28 Jan 2021 10:50)  HR: 78 (28 Jan 2021 16:53) (78 - 90)  BP: 138/74 (28 Jan 2021 16:53) (121/76 - 138/74)  BP(mean): 83 (28 Jan 2021 10:50) (83 - 83)  RR: 18 (28 Jan 2021 16:53) (18 - 20)  SpO2: 96% (28 Jan 2021 16:53) (93% - 96%)    PHYSICAL EXAM:  GEN:         Awake, responsive and comfortable.  HEENT:    NRB mask    RESP:        no distress  CVS:          Regular rate and rhythm.   ABD:         Soft, non-tender, non-distended;     MEDICATIONS  (STANDING):  amLODIPine   Tablet 5 milliGRAM(s) Oral daily  ascorbic acid 500 milliGRAM(s) Oral two times a day  dexAMETHasone  Injectable 6 milliGRAM(s) IV Push three times a day  enoxaparin Injectable 70 milliGRAM(s) SubCutaneous two times a day  influenza   Vaccine 0.5 milliLiter(s) IntraMuscular once  remdesivir  IVPB 100 milliGRAM(s) IV Intermittent every 24 hours  remdesivir  IVPB   IV Intermittent   zinc sulfate 220 milliGRAM(s) Oral daily    MEDICATIONS  (PRN):  acetaminophen   Tablet .. 650 milliGRAM(s) Oral every 4 hours PRN Temp greater or equal to 38.5C (101.3F)  ALBUTerol    90 MICROgram(s) HFA Inhaler 2 Puff(s) Inhalation every 4 hours PRN Shortness of Breath and/or Wheezing  benzonatate 100 milliGRAM(s) Oral three times a day PRN Cough    LABS:                        13.6   7.99  )-----------( 257      ( 28 Jan 2021 08:29 )             41.5     01-28    136  |  104  |  12  ----------------------------<  143<H>  3.7   |  28  |  0.73    Ca    7.8<L>      28 Jan 2021 08:29    TPro  6.4  /  Alb  2.4<L>  /  TBili  0.4  /  DBili  .10  /  AST  42<H>  /  ALT  35  /  AlkPhos  100  01-28    ASSESSMENT AND PLAN:  ·	SOB.  ·	Hypoxia.  ·	COVID pneumonia.  ·	HTN    SPO2 96% on NRB mask  Started on full dose Lovenox as D Dimer was going up.  Continue Remdisivir and Dexamethasone.   Yes